# Patient Record
Sex: FEMALE | Race: WHITE | Employment: OTHER | URBAN - METROPOLITAN AREA
[De-identification: names, ages, dates, MRNs, and addresses within clinical notes are randomized per-mention and may not be internally consistent; named-entity substitution may affect disease eponyms.]

---

## 2018-03-16 PROCEDURE — 81003 URINALYSIS AUTO W/O SCOPE: CPT | Performed by: PHYSICIAN ASSISTANT

## 2020-09-02 ENCOUNTER — HOSPITAL ENCOUNTER (EMERGENCY)
Facility: HOSPITAL | Age: 60
Discharge: HOME | End: 2020-09-02
Attending: EMERGENCY MEDICINE
Payer: COMMERCIAL

## 2020-09-02 VITALS
SYSTOLIC BLOOD PRESSURE: 123 MMHG | BODY MASS INDEX: 20.46 KG/M2 | TEMPERATURE: 97 F | RESPIRATION RATE: 13 BRPM | HEIGHT: 68 IN | WEIGHT: 135 LBS | OXYGEN SATURATION: 97 % | DIASTOLIC BLOOD PRESSURE: 101 MMHG | HEART RATE: 99 BPM

## 2020-09-02 DIAGNOSIS — R11.2 NAUSEA AND VOMITING IN ADULT: Primary | ICD-10-CM

## 2020-09-02 DIAGNOSIS — R51.9 ACUTE NONINTRACTABLE HEADACHE, UNSPECIFIED HEADACHE TYPE: ICD-10-CM

## 2020-09-02 DIAGNOSIS — F41.9 ANXIETY: ICD-10-CM

## 2020-09-02 LAB
ALBUMIN SERPL-MCNC: 3.9 G/DL (ref 3.4–5)
ALBUMIN/GLOB SERPL: 1 {RATIO} (ref 1–2)
ALP LIVER SERPL-CCNC: 58 U/L (ref 46–118)
ALT SERPL-CCNC: 14 U/L (ref 13–56)
ANION GAP SERPL CALC-SCNC: 7 MMOL/L (ref 0–18)
AST SERPL-CCNC: 18 U/L (ref 15–37)
BASOPHILS # BLD AUTO: 0.03 X10(3) UL (ref 0–0.2)
BASOPHILS NFR BLD AUTO: 0.4 %
BILIRUB SERPL-MCNC: 0.6 MG/DL (ref 0.1–2)
BUN BLD-MCNC: 14 MG/DL (ref 7–18)
BUN/CREAT SERPL: 12.4 (ref 10–20)
CALCIUM BLD-MCNC: 9.9 MG/DL (ref 8.5–10.1)
CHLORIDE SERPL-SCNC: 102 MMOL/L (ref 98–112)
CO2 SERPL-SCNC: 26 MMOL/L (ref 21–32)
CREAT BLD-MCNC: 1.13 MG/DL (ref 0.55–1.02)
DEPRECATED RDW RBC AUTO: 43 FL (ref 35.1–46.3)
EOSINOPHIL # BLD AUTO: 0.06 X10(3) UL (ref 0–0.7)
EOSINOPHIL NFR BLD AUTO: 0.9 %
ERYTHROCYTE [DISTWIDTH] IN BLOOD BY AUTOMATED COUNT: 12.7 % (ref 11–15)
GLOBULIN PLAS-MCNC: 3.8 G/DL (ref 2.8–4.4)
GLUCOSE BLD-MCNC: 94 MG/DL (ref 70–99)
HCT VFR BLD AUTO: 41.1 % (ref 35–48)
HGB BLD-MCNC: 14.3 G/DL (ref 12–16)
IMM GRANULOCYTES # BLD AUTO: 0.02 X10(3) UL (ref 0–1)
IMM GRANULOCYTES NFR BLD: 0.3 %
LYMPHOCYTES # BLD AUTO: 1.11 X10(3) UL (ref 1–4)
LYMPHOCYTES NFR BLD AUTO: 15.9 %
M PROTEIN MFR SERPL ELPH: 7.7 G/DL (ref 6.4–8.2)
MCH RBC QN AUTO: 32.3 PG (ref 26–34)
MCHC RBC AUTO-ENTMCNC: 34.8 G/DL (ref 31–37)
MCV RBC AUTO: 92.8 FL (ref 80–100)
MONOCYTES # BLD AUTO: 0.62 X10(3) UL (ref 0.1–1)
MONOCYTES NFR BLD AUTO: 8.9 %
NEUTROPHILS # BLD AUTO: 5.12 X10 (3) UL (ref 1.5–7.7)
NEUTROPHILS # BLD AUTO: 5.12 X10(3) UL (ref 1.5–7.7)
NEUTROPHILS NFR BLD AUTO: 73.6 %
OSMOLALITY SERPL CALC.SUM OF ELEC: 280 MOSM/KG (ref 275–295)
PLATELET # BLD AUTO: 326 10(3)UL (ref 150–450)
POTASSIUM SERPL-SCNC: 3.9 MMOL/L (ref 3.5–5.1)
RBC # BLD AUTO: 4.43 X10(6)UL (ref 3.8–5.3)
SODIUM SERPL-SCNC: 135 MMOL/L (ref 136–145)
WBC # BLD AUTO: 7 X10(3) UL (ref 4–11)

## 2020-09-02 PROCEDURE — 96374 THER/PROPH/DIAG INJ IV PUSH: CPT

## 2020-09-02 PROCEDURE — 96375 TX/PRO/DX INJ NEW DRUG ADDON: CPT

## 2020-09-02 PROCEDURE — 85025 COMPLETE CBC W/AUTO DIFF WBC: CPT | Performed by: EMERGENCY MEDICINE

## 2020-09-02 PROCEDURE — 96361 HYDRATE IV INFUSION ADD-ON: CPT

## 2020-09-02 PROCEDURE — 80053 COMPREHEN METABOLIC PANEL: CPT | Performed by: EMERGENCY MEDICINE

## 2020-09-02 PROCEDURE — 99284 EMERGENCY DEPT VISIT MOD MDM: CPT

## 2020-09-02 RX ORDER — KETOROLAC TROMETHAMINE 30 MG/ML
15 INJECTION, SOLUTION INTRAMUSCULAR; INTRAVENOUS ONCE
Status: COMPLETED | OUTPATIENT
Start: 2020-09-02 | End: 2020-09-02

## 2020-09-02 RX ORDER — ALPRAZOLAM 0.25 MG/1
0.25 TABLET ORAL 3 TIMES DAILY PRN
Qty: 20 TABLET | Refills: 0 | Status: SHIPPED | OUTPATIENT
Start: 2020-09-02 | End: 2020-09-09

## 2020-09-02 RX ORDER — METOCLOPRAMIDE 10 MG/1
10 TABLET ORAL 3 TIMES DAILY PRN
Qty: 20 TABLET | Refills: 0 | Status: SHIPPED | OUTPATIENT
Start: 2020-09-02 | End: 2020-09-14

## 2020-09-02 RX ORDER — DIPHENHYDRAMINE HYDROCHLORIDE 50 MG/ML
50 INJECTION INTRAMUSCULAR; INTRAVENOUS ONCE
Status: COMPLETED | OUTPATIENT
Start: 2020-09-02 | End: 2020-09-02

## 2020-09-02 RX ORDER — ALPRAZOLAM 0.25 MG/1
0.25 TABLET ORAL ONCE
Status: COMPLETED | OUTPATIENT
Start: 2020-09-02 | End: 2020-09-02

## 2020-09-02 RX ORDER — METOCLOPRAMIDE HYDROCHLORIDE 5 MG/ML
10 INJECTION INTRAMUSCULAR; INTRAVENOUS ONCE
Status: COMPLETED | OUTPATIENT
Start: 2020-09-02 | End: 2020-09-02

## 2020-09-02 RX ORDER — BUSPIRONE HYDROCHLORIDE 5 MG/1
5 TABLET ORAL 2 TIMES DAILY
COMMUNITY
End: 2020-09-03

## 2020-09-02 NOTE — ED INITIAL ASSESSMENT (HPI)
PT TO ED FROM HOME WITH C/O N/V/D. LAST Wednesday SEEN AT [facility] FOR ANXIETY AND MEDICATION EVAL. PLACED ON BUSPAR AND SAYS IT IS NOT HELPING AND MAKING HER SICK TO HER STOMACH. ZOFRAN NOT HELPING.  DENIES SI/HI

## 2020-09-02 NOTE — ED PROVIDER NOTES
Patient Seen in: [facility] Emergency Department      History   Patient presents with:  Medication Reaction    Stated Complaint: evalp p medication problem    HPI    24-year-old female presents to the emergency department complaining of vomiting, el ED Triage Vitals [09/02/20 1443]   /74   Pulse 109   Resp 20   Temp 97.4 °F (36.3 °C)   Temp src Temporal   SpO2 97 %   O2 Device None (Room air)       Current:BP (!) 123/101   Pulse 99   Temp 97.4 °F (36.3 °C) (Temporal)   Resp 13   Ht 172.7 cm anxiety. Upon reevaluation the patient was significantly symptomatically improved. Heart rate had normalized, headache was nearly gone and the patient was no longer nauseated. Test results and treatment plan were discussed. MDM     #1.   Nause

## 2020-10-28 PROBLEM — R00.2 PALPITATIONS: Status: ACTIVE | Noted: 2020-10-28

## 2020-10-28 PROBLEM — I10 ESSENTIAL HYPERTENSION: Status: ACTIVE | Noted: 2020-10-28

## 2020-10-28 PROBLEM — F41.9 ANXIETY: Status: ACTIVE | Noted: 2020-10-28

## 2020-11-03 PROBLEM — F32.2 MDD (MAJOR DEPRESSIVE DISORDER), SEVERE (HCC): Status: ACTIVE | Noted: 2020-11-03

## 2020-11-09 PROBLEM — Z72.0 TOBACCO USE: Status: ACTIVE | Noted: 2020-11-09

## 2020-11-25 ENCOUNTER — HOSPITAL ENCOUNTER (EMERGENCY)
Facility: HOSPITAL | Age: 60
Discharge: HOME | End: 2020-11-25
Attending: EMERGENCY MEDICINE
Payer: COMMERCIAL

## 2020-11-25 ENCOUNTER — APPOINTMENT (OUTPATIENT)
Dept: GENERAL RADIOLOGY | Facility: HOSPITAL | Age: 60
End: 2020-11-25
Attending: EMERGENCY MEDICINE
Payer: COMMERCIAL

## 2020-11-25 ENCOUNTER — APPOINTMENT (OUTPATIENT)
Dept: CT IMAGING | Facility: HOSPITAL | Age: 60
End: 2020-11-25
Attending: EMERGENCY MEDICINE
Payer: COMMERCIAL

## 2020-11-25 VITALS
HEART RATE: 81 BPM | DIASTOLIC BLOOD PRESSURE: 63 MMHG | TEMPERATURE: 99 F | RESPIRATION RATE: 20 BRPM | SYSTOLIC BLOOD PRESSURE: 125 MMHG | WEIGHT: 130 LBS | OXYGEN SATURATION: 100 % | BODY MASS INDEX: 20 KG/M2

## 2020-11-25 DIAGNOSIS — R07.9 CHEST PAIN, UNSPECIFIED TYPE: Primary | ICD-10-CM

## 2020-11-25 DIAGNOSIS — F41.9 ANXIETY: ICD-10-CM

## 2020-11-25 DIAGNOSIS — R91.8 LUNG MASS: ICD-10-CM

## 2020-11-25 PROCEDURE — 80053 COMPREHEN METABOLIC PANEL: CPT | Performed by: EMERGENCY MEDICINE

## 2020-11-25 PROCEDURE — 71260 CT THORAX DX C+: CPT | Performed by: EMERGENCY MEDICINE

## 2020-11-25 PROCEDURE — 84484 ASSAY OF TROPONIN QUANT: CPT | Performed by: EMERGENCY MEDICINE

## 2020-11-25 PROCEDURE — 93010 ELECTROCARDIOGRAM REPORT: CPT

## 2020-11-25 PROCEDURE — 71045 X-RAY EXAM CHEST 1 VIEW: CPT | Performed by: EMERGENCY MEDICINE

## 2020-11-25 PROCEDURE — 96374 THER/PROPH/DIAG INJ IV PUSH: CPT

## 2020-11-25 PROCEDURE — 99285 EMERGENCY DEPT VISIT HI MDM: CPT

## 2020-11-25 PROCEDURE — 93005 ELECTROCARDIOGRAM TRACING: CPT

## 2020-11-25 PROCEDURE — 96375 TX/PRO/DX INJ NEW DRUG ADDON: CPT

## 2020-11-25 PROCEDURE — 85025 COMPLETE CBC W/AUTO DIFF WBC: CPT | Performed by: EMERGENCY MEDICINE

## 2020-11-25 RX ORDER — LORAZEPAM 0.5 MG/1
0.5 TABLET ORAL
Qty: 10 TABLET | Refills: 0 | Status: ON HOLD | OUTPATIENT
Start: 2020-11-25 | End: 2020-12-11

## 2020-11-25 RX ORDER — LORAZEPAM 2 MG/ML
1 INJECTION INTRAMUSCULAR ONCE
Status: COMPLETED | OUTPATIENT
Start: 2020-11-25 | End: 2020-11-25

## 2020-11-25 RX ORDER — KETOROLAC TROMETHAMINE 30 MG/ML
30 INJECTION, SOLUTION INTRAMUSCULAR; INTRAVENOUS ONCE
Status: COMPLETED | OUTPATIENT
Start: 2020-11-25 | End: 2020-11-25

## 2020-11-25 NOTE — ED INITIAL ASSESSMENT (HPI)
C/o chest pain this am,  With SOB and palpitations,  Seen for same 3 times this week at [facility],  Pt is unable to stand without assistance, clutching chest, c/o inability to catch breath, headache, restless on cart.

## 2020-11-25 NOTE — ED PROVIDER NOTES
Patient Seen in: [facility] Emergency Department      History   Patient presents with:  Chest Pain Angina    Stated Complaint: chest pain    HPI    The patient is a 69-year-old female with a history of anxiety, COPD, presenting the emergency departm 89   Resp 11/25/20 1023 26   Temp 11/25/20 1023 98.6 °F (37 °C)   Temp src 11/25/20 1023 Temporal   SpO2 11/25/20 1023 100 %   O2 Device 11/25/20 1023 None (Room air)       Current:/63   Pulse 81   Temp 98.6 °F (37 °C) (Temporal)   Resp 20   Wt 59 kg PLATELET    Narrative: The following orders were created for panel order CBC WITH DIFFERENTIAL WITH PLATELET.   Procedure                               Abnormality         Status                     ---------                               ----------- NRDR ([address]) which includes the     Dose Index Registry. PATIENT STATED HISTORY:(As transcribed by Technologist)  Patient presents     with chest pain this morning with some shortness of breath and anxiety. PTSD. biopsy. No evidence of aortic aneurysm or aortic dissection. Minimal to mild     calcified plaque formation. Normal brachiocephalic trunk, left common     carotid artery and left subclavian arteries.                         Dictated by (CST): Paula Shaikh evidence of dissection or clot. There is incidentally a mass in the right upper lobe as described above. I reviewed this with the patient, she states that she actually had a lobectomy due to scar tissue versus mass at [facility], with Dr. Sean Gardner.   However she walker

## 2020-11-28 PROBLEM — F33.2 MDD (MAJOR DEPRESSIVE DISORDER), RECURRENT EPISODE, SEVERE (HCC): Status: ACTIVE | Noted: 2020-11-28

## 2020-11-29 PROBLEM — T50.902A INTENTIONAL DRUG OVERDOSE (HCC): Status: ACTIVE | Noted: 2020-11-29

## 2020-12-02 ENCOUNTER — ANESTHESIA EVENT (OUTPATIENT)
Dept: POSTOP/PACU | Facility: HOSPITAL | Age: 60
End: 2020-12-02
Payer: COMMERCIAL

## 2020-12-02 ENCOUNTER — ANESTHESIA (OUTPATIENT)
Dept: POSTOP/PACU | Facility: HOSPITAL | Age: 60
End: 2020-12-02
Payer: COMMERCIAL

## 2020-12-02 ENCOUNTER — HOSPITAL ENCOUNTER (OUTPATIENT)
Dept: POSTOP/PACU | Facility: HOSPITAL | Age: 60
Discharge: HOME | End: 2020-12-02
Attending: Other
Payer: COMMERCIAL

## 2020-12-02 VITALS
TEMPERATURE: 99 F | BODY MASS INDEX: 19.85 KG/M2 | SYSTOLIC BLOOD PRESSURE: 144 MMHG | RESPIRATION RATE: 11 BRPM | HEART RATE: 69 BPM | HEIGHT: 68 IN | OXYGEN SATURATION: 99 % | WEIGHT: 131 LBS | DIASTOLIC BLOOD PRESSURE: 66 MMHG

## 2020-12-02 DIAGNOSIS — F33.2 SEVERE EPISODE OF RECURRENT MAJOR DEPRESSIVE DISORDER, WITHOUT PSYCHOTIC FEATURES (HCC): ICD-10-CM

## 2020-12-02 DIAGNOSIS — F32.2 MDD (MAJOR DEPRESSIVE DISORDER), SEVERE (HCC): ICD-10-CM

## 2020-12-02 PROBLEM — Z98.890 S/P ECT (ELECTROCONVULSIVE THERAPY): Status: ACTIVE | Noted: 2020-12-02

## 2020-12-02 RX ORDER — HYDROMORPHONE HYDROCHLORIDE 1 MG/ML
0.4 INJECTION, SOLUTION INTRAMUSCULAR; INTRAVENOUS; SUBCUTANEOUS EVERY 5 MIN PRN
Status: DISCONTINUED | OUTPATIENT
Start: 2020-12-02 | End: 2020-12-02 | Stop reason: HOSPADM

## 2020-12-02 RX ORDER — KETAMINE HYDROCHLORIDE 50 MG/ML
INJECTION, SOLUTION, CONCENTRATE INTRAMUSCULAR; INTRAVENOUS AS NEEDED
Status: DISCONTINUED | OUTPATIENT
Start: 2020-12-02 | End: 2020-12-02 | Stop reason: SURG

## 2020-12-02 RX ORDER — CAFFEINE AND SODIUM BENZOATE 125 MG/ML
INJECTION, SOLUTION INTRAMUSCULAR; INTRAVENOUS
Status: COMPLETED
Start: 2020-12-02 | End: 2020-12-02

## 2020-12-02 RX ORDER — ETOMIDATE 2 MG/ML
INJECTION INTRAVENOUS AS NEEDED
Status: DISCONTINUED | OUTPATIENT
Start: 2020-12-02 | End: 2020-12-02 | Stop reason: SURG

## 2020-12-02 RX ORDER — SODIUM CHLORIDE, SODIUM LACTATE, POTASSIUM CHLORIDE, CALCIUM CHLORIDE 600; 310; 30; 20 MG/100ML; MG/100ML; MG/100ML; MG/100ML
INJECTION, SOLUTION INTRAVENOUS CONTINUOUS
Status: DISCONTINUED | OUTPATIENT
Start: 2020-12-02 | End: 2020-12-02 | Stop reason: HOSPADM

## 2020-12-02 RX ORDER — ONDANSETRON 2 MG/ML
INJECTION INTRAMUSCULAR; INTRAVENOUS
Status: COMPLETED
Start: 2020-12-02 | End: 2020-12-02

## 2020-12-02 RX ORDER — NALOXONE HYDROCHLORIDE 0.4 MG/ML
80 INJECTION, SOLUTION INTRAMUSCULAR; INTRAVENOUS; SUBCUTANEOUS AS NEEDED
Status: DISCONTINUED | OUTPATIENT
Start: 2020-12-02 | End: 2020-12-02 | Stop reason: HOSPADM

## 2020-12-02 RX ORDER — KETOROLAC TROMETHAMINE 30 MG/ML
INJECTION, SOLUTION INTRAMUSCULAR; INTRAVENOUS
Status: COMPLETED
Start: 2020-12-02 | End: 2020-12-02

## 2020-12-02 RX ORDER — KETOROLAC TROMETHAMINE 30 MG/ML
15 INJECTION, SOLUTION INTRAMUSCULAR; INTRAVENOUS ONCE
Status: COMPLETED | OUTPATIENT
Start: 2020-12-02 | End: 2020-12-02

## 2020-12-02 RX ORDER — LIDOCAINE HYDROCHLORIDE 10 MG/ML
INJECTION, SOLUTION EPIDURAL; INFILTRATION; INTRACAUDAL; PERINEURAL AS NEEDED
Status: DISCONTINUED | OUTPATIENT
Start: 2020-12-02 | End: 2020-12-02 | Stop reason: SURG

## 2020-12-02 RX ORDER — GLYCOPYRROLATE 0.2 MG/ML
0.1 INJECTION, SOLUTION INTRAMUSCULAR; INTRAVENOUS ONCE
Status: COMPLETED | OUTPATIENT
Start: 2020-12-02 | End: 2020-12-02

## 2020-12-02 RX ORDER — SODIUM CHLORIDE, SODIUM LACTATE, POTASSIUM CHLORIDE, CALCIUM CHLORIDE 600; 310; 30; 20 MG/100ML; MG/100ML; MG/100ML; MG/100ML
INJECTION, SOLUTION INTRAVENOUS CONTINUOUS
Status: DISCONTINUED | OUTPATIENT
Start: 2020-12-02 | End: 2020-12-04

## 2020-12-02 RX ORDER — ONDANSETRON 2 MG/ML
4 INJECTION INTRAMUSCULAR; INTRAVENOUS ONCE
Status: COMPLETED | OUTPATIENT
Start: 2020-12-02 | End: 2020-12-02

## 2020-12-02 RX ORDER — HYDROMORPHONE HYDROCHLORIDE 1 MG/ML
INJECTION, SOLUTION INTRAMUSCULAR; INTRAVENOUS; SUBCUTANEOUS
Status: COMPLETED
Start: 2020-12-02 | End: 2020-12-02

## 2020-12-02 RX ORDER — CAFFEINE AND SODIUM BENZOATE 125 MG/ML
250 INJECTION, SOLUTION INTRAMUSCULAR; INTRAVENOUS ONCE
Status: COMPLETED | OUTPATIENT
Start: 2020-12-02 | End: 2020-12-02

## 2020-12-02 RX ORDER — GLYCOPYRROLATE 0.2 MG/ML
INJECTION, SOLUTION INTRAMUSCULAR; INTRAVENOUS
Status: COMPLETED
Start: 2020-12-02 | End: 2020-12-02

## 2020-12-02 RX ADMIN — ETOMIDATE 12 MG: 2 INJECTION INTRAVENOUS at 06:42:00

## 2020-12-02 RX ADMIN — HYDROMORPHONE HYDROCHLORIDE 0.4 MG: 1 INJECTION, SOLUTION INTRAMUSCULAR; INTRAVENOUS; SUBCUTANEOUS at 07:33:00

## 2020-12-02 RX ADMIN — ONDANSETRON 4 MG: 2 INJECTION INTRAMUSCULAR; INTRAVENOUS at 06:19:00

## 2020-12-02 RX ADMIN — SODIUM CHLORIDE, SODIUM LACTATE, POTASSIUM CHLORIDE, CALCIUM CHLORIDE: 600; 310; 30; 20 INJECTION, SOLUTION INTRAVENOUS at 06:19:00

## 2020-12-02 RX ADMIN — LIDOCAINE HYDROCHLORIDE 20 MG: 10 INJECTION, SOLUTION EPIDURAL; INFILTRATION; INTRACAUDAL; PERINEURAL at 06:42:00

## 2020-12-02 RX ADMIN — KETOROLAC TROMETHAMINE 15 MG: 30 INJECTION, SOLUTION INTRAMUSCULAR; INTRAVENOUS at 06:19:00

## 2020-12-02 RX ADMIN — CAFFEINE AND SODIUM BENZOATE 250 MG: 125 INJECTION, SOLUTION INTRAMUSCULAR; INTRAVENOUS at 06:20:00

## 2020-12-02 RX ADMIN — KETAMINE HYDROCHLORIDE 25 MG: 50 INJECTION, SOLUTION, CONCENTRATE INTRAMUSCULAR; INTRAVENOUS at 06:42:00

## 2020-12-02 RX ADMIN — GLYCOPYRROLATE 0.1 MG: 0.2 INJECTION, SOLUTION INTRAMUSCULAR; INTRAVENOUS at 06:19:00

## 2020-12-02 RX ADMIN — SODIUM CHLORIDE, SODIUM LACTATE, POTASSIUM CHLORIDE, CALCIUM CHLORIDE: 600; 310; 30; 20 INJECTION, SOLUTION INTRAVENOUS at 06:57:00

## 2020-12-02 NOTE — PROGRESS NOTES
[facility] / [facility]  ECT History & Physical    Enio Mensah Patient Status:  Outpatient   Age/Gender 61year old female MRN [id number]   Location [address] [facility] Attending Maira Alarcon MD   Hosp Day # 0 PCP Mychal Ryder mucosa normal, no drainage    or sinus tenderness   Throat:   Lips, mucosa, and tongue normal; patient with poor dentition and gingivitis and patient with crack in one tooth   Neck:   Supple, symmetrical, trachea midline   Lungs:     Clear to auscultation

## 2020-12-02 NOTE — PROGRESS NOTES
[facility] / [facility]  ECT Procedure Note    [place] Parkinson Patient Status:  Outpatient   Age/Gender 61year old female MRN [id number]   Location 13[address] [facility] Attending Rose Castañeda MD   Hosp Day # 0 PCP Marialuisa Hyman Robinul 0.1 mg IV, Toradol 15 mg IV, Zofran 4 mg IV and Caffeine 250 mg IV    ECT Medications:  Labetalol 10 mg IV, Anesthetic  Etomidate 12 mg IV followed by ketamine 25 mg IV and Succinylcholine 60 mg IV and use of soft bite block    Seizure Duration:  M

## 2020-12-02 NOTE — ANESTHESIA PREPROCEDURE EVALUATION
PRE-OP EVALUATION    Patient Name: Falguni Cobian    Pre-op Diagnosis: * No surgery found *    * No surgery found *    * Surgery not found *    Pre-op vitals reviewed.   Temp: 99.4 °F (37.4 °C)  Pulse: 77  Resp: 24  BP: 122/74  SpO2: 98 %  Body mass index (DULCOLAX) rectal suppository 10 mg, 10 mg, Rectal, Daily PRN    •  Propranolol HCl (INDERAL) tab 10 mg, 10 mg, Oral, QID PRN    •  magnesium hydroxide (MILK OF MAGNESIA) 400 MG/5ML suspension 30 mL, 30 mL, Oral, Nightly PRN    •  Alum & Mag Hydroxide-Ayse pre-op labs reviewed.   Lab Results   Component Value Date    WBC 8.8 11/25/2020    RBC 4.78 11/25/2020    HGB 15.5 11/25/2020    HCT 42.6 11/25/2020    MCV 89.1 11/25/2020    MCH 32.4 11/25/2020    MCHC 36.4 11/25/2020    RDW 12.0 11/25/2020    .0 1

## 2020-12-02 NOTE — ANESTHESIA POSTPROCEDURE EVALUATION
18 [facility] Patient Status:  Outpatient   Age/Gender 61year old female MRN [id number]   Location 1310 [facility] Attending Glennie Cushing, MD   Hosp Day # 0 PCP Giselle Busby DO       Anesthesia Post-op Note

## 2020-12-04 ENCOUNTER — ANESTHESIA EVENT (OUTPATIENT)
Dept: POSTOP/PACU | Facility: HOSPITAL | Age: 60
End: 2020-12-04
Payer: COMMERCIAL

## 2020-12-04 ENCOUNTER — HOSPITAL ENCOUNTER (OUTPATIENT)
Dept: POSTOP/PACU | Facility: HOSPITAL | Age: 60
Discharge: HOME | End: 2020-12-04
Attending: Other
Payer: COMMERCIAL

## 2020-12-04 ENCOUNTER — ANESTHESIA (OUTPATIENT)
Dept: POSTOP/PACU | Facility: HOSPITAL | Age: 60
End: 2020-12-04
Payer: COMMERCIAL

## 2020-12-04 VITALS
OXYGEN SATURATION: 100 % | TEMPERATURE: 98 F | HEART RATE: 77 BPM | BODY MASS INDEX: 20 KG/M2 | SYSTOLIC BLOOD PRESSURE: 154 MMHG | RESPIRATION RATE: 16 BRPM | HEIGHT: 68 IN | DIASTOLIC BLOOD PRESSURE: 68 MMHG

## 2020-12-04 DIAGNOSIS — F33.2 SEVERE EPISODE OF RECURRENT MAJOR DEPRESSIVE DISORDER, WITHOUT PSYCHOTIC FEATURES (HCC): ICD-10-CM

## 2020-12-04 DIAGNOSIS — F32.2 MDD (MAJOR DEPRESSIVE DISORDER), SEVERE (HCC): ICD-10-CM

## 2020-12-04 RX ORDER — GLYCOPYRROLATE 0.2 MG/ML
INJECTION, SOLUTION INTRAMUSCULAR; INTRAVENOUS
Status: COMPLETED
Start: 2020-12-04 | End: 2020-12-04

## 2020-12-04 RX ORDER — SODIUM CHLORIDE, SODIUM LACTATE, POTASSIUM CHLORIDE, CALCIUM CHLORIDE 600; 310; 30; 20 MG/100ML; MG/100ML; MG/100ML; MG/100ML
INJECTION, SOLUTION INTRAVENOUS CONTINUOUS
Status: DISCONTINUED | OUTPATIENT
Start: 2020-12-04 | End: 2020-12-04 | Stop reason: HOSPADM

## 2020-12-04 RX ORDER — HYDROMORPHONE HYDROCHLORIDE 1 MG/ML
0.4 INJECTION, SOLUTION INTRAMUSCULAR; INTRAVENOUS; SUBCUTANEOUS EVERY 5 MIN PRN
Status: DISCONTINUED | OUTPATIENT
Start: 2020-12-04 | End: 2020-12-04 | Stop reason: HOSPADM

## 2020-12-04 RX ORDER — SUMATRIPTAN 6 MG/.5ML
6 INJECTION, SOLUTION SUBCUTANEOUS ONCE
Status: COMPLETED | OUTPATIENT
Start: 2020-12-04 | End: 2020-12-04

## 2020-12-04 RX ORDER — ONDANSETRON 2 MG/ML
INJECTION INTRAMUSCULAR; INTRAVENOUS
Status: COMPLETED
Start: 2020-12-04 | End: 2020-12-04

## 2020-12-04 RX ORDER — DEXAMETHASONE SODIUM PHOSPHATE 4 MG/ML
8 VIAL (ML) INJECTION ONCE
Status: COMPLETED | OUTPATIENT
Start: 2020-12-04 | End: 2020-12-04

## 2020-12-04 RX ORDER — KETOROLAC TROMETHAMINE 30 MG/ML
INJECTION, SOLUTION INTRAMUSCULAR; INTRAVENOUS
Status: COMPLETED
Start: 2020-12-04 | End: 2020-12-04

## 2020-12-04 RX ORDER — KETAMINE HYDROCHLORIDE 50 MG/ML
INJECTION, SOLUTION, CONCENTRATE INTRAMUSCULAR; INTRAVENOUS AS NEEDED
Status: DISCONTINUED | OUTPATIENT
Start: 2020-12-04 | End: 2020-12-04 | Stop reason: SURG

## 2020-12-04 RX ORDER — LABETALOL HYDROCHLORIDE 5 MG/ML
INJECTION, SOLUTION INTRAVENOUS AS NEEDED
Status: DISCONTINUED | OUTPATIENT
Start: 2020-12-04 | End: 2020-12-04 | Stop reason: SURG

## 2020-12-04 RX ORDER — ONDANSETRON 2 MG/ML
4 INJECTION INTRAMUSCULAR; INTRAVENOUS AS NEEDED
Status: DISCONTINUED | OUTPATIENT
Start: 2020-12-04 | End: 2020-12-04 | Stop reason: HOSPADM

## 2020-12-04 RX ORDER — CAFFEINE AND SODIUM BENZOATE 125 MG/ML
INJECTION, SOLUTION INTRAMUSCULAR; INTRAVENOUS
Status: COMPLETED
Start: 2020-12-04 | End: 2020-12-04

## 2020-12-04 RX ORDER — SUMATRIPTAN 6 MG/.5ML
INJECTION, SOLUTION SUBCUTANEOUS
Status: COMPLETED
Start: 2020-12-04 | End: 2020-12-04

## 2020-12-04 RX ORDER — DEXAMETHASONE SODIUM PHOSPHATE 4 MG/ML
VIAL (ML) INJECTION
Status: COMPLETED
Start: 2020-12-04 | End: 2020-12-04

## 2020-12-04 RX ORDER — CAFFEINE AND SODIUM BENZOATE 125 MG/ML
250 INJECTION, SOLUTION INTRAMUSCULAR; INTRAVENOUS ONCE
Status: COMPLETED | OUTPATIENT
Start: 2020-12-04 | End: 2020-12-04

## 2020-12-04 RX ORDER — KETOROLAC TROMETHAMINE 30 MG/ML
30 INJECTION, SOLUTION INTRAMUSCULAR; INTRAVENOUS ONCE
Status: COMPLETED | OUTPATIENT
Start: 2020-12-04 | End: 2020-12-04

## 2020-12-04 RX ORDER — ONDANSETRON 2 MG/ML
4 INJECTION INTRAMUSCULAR; INTRAVENOUS ONCE
Status: COMPLETED | OUTPATIENT
Start: 2020-12-04 | End: 2020-12-04

## 2020-12-04 RX ORDER — GLYCOPYRROLATE 0.2 MG/ML
0.1 INJECTION, SOLUTION INTRAMUSCULAR; INTRAVENOUS ONCE
Status: COMPLETED | OUTPATIENT
Start: 2020-12-04 | End: 2020-12-04

## 2020-12-04 RX ORDER — SODIUM CHLORIDE, SODIUM LACTATE, POTASSIUM CHLORIDE, CALCIUM CHLORIDE 600; 310; 30; 20 MG/100ML; MG/100ML; MG/100ML; MG/100ML
INJECTION, SOLUTION INTRAVENOUS CONTINUOUS
Status: DISCONTINUED | OUTPATIENT
Start: 2020-12-04 | End: 2020-12-06

## 2020-12-04 RX ADMIN — ONDANSETRON 4 MG: 2 INJECTION INTRAMUSCULAR; INTRAVENOUS at 07:27:00

## 2020-12-04 RX ADMIN — SUMATRIPTAN 6 MG: 6 INJECTION, SOLUTION SUBCUTANEOUS at 07:01:00

## 2020-12-04 RX ADMIN — ONDANSETRON 4 MG: 2 INJECTION INTRAMUSCULAR; INTRAVENOUS at 06:11:00

## 2020-12-04 RX ADMIN — LABETALOL HYDROCHLORIDE 10 MG: 5 INJECTION, SOLUTION INTRAVENOUS at 06:54:00

## 2020-12-04 RX ADMIN — GLYCOPYRROLATE 0.1 MG: 0.2 INJECTION, SOLUTION INTRAMUSCULAR; INTRAVENOUS at 06:04:00

## 2020-12-04 RX ADMIN — SODIUM CHLORIDE, SODIUM LACTATE, POTASSIUM CHLORIDE, CALCIUM CHLORIDE: 600; 310; 30; 20 INJECTION, SOLUTION INTRAVENOUS at 06:03:00

## 2020-12-04 RX ADMIN — DEXAMETHASONE SODIUM PHOSPHATE 8 MG: 4 MG/ML VIAL (ML) INJECTION at 06:08:00

## 2020-12-04 RX ADMIN — KETOROLAC TROMETHAMINE 30 MG: 30 INJECTION, SOLUTION INTRAMUSCULAR; INTRAVENOUS at 06:06:00

## 2020-12-04 RX ADMIN — KETAMINE HYDROCHLORIDE 75 MG: 50 INJECTION, SOLUTION, CONCENTRATE INTRAMUSCULAR; INTRAVENOUS at 06:54:00

## 2020-12-04 RX ADMIN — CAFFEINE AND SODIUM BENZOATE 250 MG: 125 INJECTION, SOLUTION INTRAMUSCULAR; INTRAVENOUS at 06:30:00

## 2020-12-04 NOTE — PROGRESS NOTES
[facility] / [facility]  ECT History & Physical    April Perish Patient Status:  Outpatient   Age/Gender 61year old female MRN [id number]   Location 1310 [facility] Attending Magdalene Baig MD   Hosp Day # 0 PCP Nikole Castillo Lips, mucosa, and tongue normal; left molar unchanged   Neck:   Supple, symmetrical, trachea midline   Lungs:     Clear to auscultation bilaterally, respirations unlabored    Heart:    Regular rate and rhythm, S1 and S2 normal, no murmur, rub   or gallop

## 2020-12-04 NOTE — ANESTHESIA PREPROCEDURE EVALUATION
PRE-OP EVALUATION    Patient Name: Hui Boyle    Pre-op Diagnosis: * No surgery found *    * No surgery found *    * Surgery not found *    Pre-op vitals reviewed.   Temp: 98.3 °F (36.8 °C)  Pulse: 82  Resp: 16  BP: 121/55  SpO2: 96 %  There is no hei Oral, Q1H PRN    •  docusate sodium (COLACE) cap 100 mg, 100 mg, Oral, BID    •  PEG 3350 (MIRALAX) powder packet 17 g, 17 g, Oral, Daily PRN    •  Acidophilus/Pectin (PROBIOTIC) CAPS 1 capsule, 1 capsule, Oral, Daily    •  bisacodyl (DULCOLAX) rectal supp Quit date: 2020        Years since quittin.0      Smokeless tobacco: Former User        Quit date: 2018      Tobacco comment: wearing a patch since 2020    Alcohol use: Never      Frequency: Never      Comment: very rare      Drug use:

## 2020-12-04 NOTE — ANESTHESIA POSTPROCEDURE EVALUATION
18 [facility] Patient Status:  Outpatient   Age/Gender 61year old female MRN [id number]   Location 1310 [facility] Attending Melany Burr MD   Hosp Day # 0 PCP Verner Nao, DO       Anesthesia Post-op Note

## 2020-12-04 NOTE — PROGRESS NOTES
[facility] / [facility]  ECT Procedure Note    Harshad Dutton Patient Status:  Outpatient   Age/Gender 61year old female MRN [id number]   Location 1310 [facility] Attending Lord Indy MD   Hosp Day # 0 PCP Mary Salas milligrams IV and use of soft bite block    Seizure Duration:  Motor: 28 seconds       EE seconds    Post-ECT Condition:  Treatment unremarkable    Post-ECT Medications:  Tigan 200 mg IM    Al Zhang

## 2020-12-07 ENCOUNTER — ANESTHESIA EVENT (OUTPATIENT)
Dept: POSTOP/PACU | Facility: HOSPITAL | Age: 60
End: 2020-12-07
Payer: COMMERCIAL

## 2020-12-07 ENCOUNTER — ANESTHESIA (OUTPATIENT)
Dept: POSTOP/PACU | Facility: HOSPITAL | Age: 60
End: 2020-12-07
Payer: COMMERCIAL

## 2020-12-07 ENCOUNTER — HOSPITAL ENCOUNTER (OUTPATIENT)
Dept: POSTOP/PACU | Facility: HOSPITAL | Age: 60
Discharge: HOME | End: 2020-12-07
Attending: Other
Payer: COMMERCIAL

## 2020-12-07 VITALS
RESPIRATION RATE: 18 BRPM | SYSTOLIC BLOOD PRESSURE: 142 MMHG | TEMPERATURE: 98 F | BODY MASS INDEX: 20 KG/M2 | HEART RATE: 86 BPM | HEIGHT: 68 IN | OXYGEN SATURATION: 97 % | DIASTOLIC BLOOD PRESSURE: 68 MMHG

## 2020-12-07 DIAGNOSIS — F33.2 SEVERE EPISODE OF RECURRENT MAJOR DEPRESSIVE DISORDER, WITHOUT PSYCHOTIC FEATURES (HCC): ICD-10-CM

## 2020-12-07 DIAGNOSIS — F32.2 MDD (MAJOR DEPRESSIVE DISORDER), SEVERE (HCC): ICD-10-CM

## 2020-12-07 RX ORDER — DEXAMETHASONE SODIUM PHOSPHATE 4 MG/ML
VIAL (ML) INJECTION
Status: COMPLETED
Start: 2020-12-07 | End: 2020-12-07

## 2020-12-07 RX ORDER — ONDANSETRON 2 MG/ML
INJECTION INTRAMUSCULAR; INTRAVENOUS
Status: COMPLETED
Start: 2020-12-07 | End: 2020-12-07

## 2020-12-07 RX ORDER — ONDANSETRON 2 MG/ML
8 INJECTION INTRAMUSCULAR; INTRAVENOUS ONCE
Status: COMPLETED | OUTPATIENT
Start: 2020-12-07 | End: 2020-12-07

## 2020-12-07 RX ORDER — KETOROLAC TROMETHAMINE 30 MG/ML
30 INJECTION, SOLUTION INTRAMUSCULAR; INTRAVENOUS ONCE
Status: COMPLETED | OUTPATIENT
Start: 2020-12-07 | End: 2020-12-07

## 2020-12-07 RX ORDER — KETOROLAC TROMETHAMINE 30 MG/ML
INJECTION, SOLUTION INTRAMUSCULAR; INTRAVENOUS
Status: COMPLETED
Start: 2020-12-07 | End: 2020-12-07

## 2020-12-07 RX ORDER — CAFFEINE AND SODIUM BENZOATE 125 MG/ML
INJECTION, SOLUTION INTRAMUSCULAR; INTRAVENOUS
Status: COMPLETED
Start: 2020-12-07 | End: 2020-12-07

## 2020-12-07 RX ORDER — GLYCOPYRROLATE 0.2 MG/ML
0.1 INJECTION, SOLUTION INTRAMUSCULAR; INTRAVENOUS ONCE
Status: COMPLETED | OUTPATIENT
Start: 2020-12-07 | End: 2020-12-07

## 2020-12-07 RX ORDER — SODIUM CHLORIDE, SODIUM LACTATE, POTASSIUM CHLORIDE, CALCIUM CHLORIDE 600; 310; 30; 20 MG/100ML; MG/100ML; MG/100ML; MG/100ML
INJECTION, SOLUTION INTRAVENOUS CONTINUOUS
Status: DISCONTINUED | OUTPATIENT
Start: 2020-12-07 | End: 2020-12-09

## 2020-12-07 RX ORDER — LABETALOL HYDROCHLORIDE 5 MG/ML
INJECTION, SOLUTION INTRAVENOUS AS NEEDED
Status: DISCONTINUED | OUTPATIENT
Start: 2020-12-07 | End: 2020-12-07 | Stop reason: SURG

## 2020-12-07 RX ORDER — SUMATRIPTAN 6 MG/.5ML
6 INJECTION, SOLUTION SUBCUTANEOUS ONCE
Status: COMPLETED | OUTPATIENT
Start: 2020-12-07 | End: 2020-12-07

## 2020-12-07 RX ORDER — KETAMINE HYDROCHLORIDE 50 MG/ML
INJECTION, SOLUTION, CONCENTRATE INTRAMUSCULAR; INTRAVENOUS AS NEEDED
Status: DISCONTINUED | OUTPATIENT
Start: 2020-12-07 | End: 2020-12-07 | Stop reason: SURG

## 2020-12-07 RX ORDER — GLYCOPYRROLATE 0.2 MG/ML
INJECTION, SOLUTION INTRAMUSCULAR; INTRAVENOUS
Status: COMPLETED
Start: 2020-12-07 | End: 2020-12-07

## 2020-12-07 RX ORDER — SUMATRIPTAN 6 MG/.5ML
INJECTION, SOLUTION SUBCUTANEOUS
Status: COMPLETED
Start: 2020-12-07 | End: 2020-12-07

## 2020-12-07 RX ORDER — DEXAMETHASONE SODIUM PHOSPHATE 4 MG/ML
8 VIAL (ML) INJECTION ONCE
Status: COMPLETED | OUTPATIENT
Start: 2020-12-07 | End: 2020-12-07

## 2020-12-07 RX ORDER — CAFFEINE AND SODIUM BENZOATE 125 MG/ML
250 INJECTION, SOLUTION INTRAMUSCULAR; INTRAVENOUS ONCE
Status: COMPLETED | OUTPATIENT
Start: 2020-12-07 | End: 2020-12-07

## 2020-12-07 RX ADMIN — SODIUM CHLORIDE, SODIUM LACTATE, POTASSIUM CHLORIDE, CALCIUM CHLORIDE: 600; 310; 30; 20 INJECTION, SOLUTION INTRAVENOUS at 06:44:00

## 2020-12-07 RX ADMIN — SUMATRIPTAN 6 MG: 6 INJECTION, SOLUTION SUBCUTANEOUS at 07:47:00

## 2020-12-07 RX ADMIN — ONDANSETRON 8 MG: 2 INJECTION INTRAMUSCULAR; INTRAVENOUS at 06:44:00

## 2020-12-07 RX ADMIN — DEXAMETHASONE SODIUM PHOSPHATE 8 MG: 4 MG/ML VIAL (ML) INJECTION at 06:44:00

## 2020-12-07 RX ADMIN — KETOROLAC TROMETHAMINE 30 MG: 30 INJECTION, SOLUTION INTRAMUSCULAR; INTRAVENOUS at 06:48:00

## 2020-12-07 RX ADMIN — GLYCOPYRROLATE 0.1 MG: 0.2 INJECTION, SOLUTION INTRAMUSCULAR; INTRAVENOUS at 06:47:00

## 2020-12-07 RX ADMIN — CAFFEINE AND SODIUM BENZOATE 250 MG: 125 INJECTION, SOLUTION INTRAMUSCULAR; INTRAVENOUS at 07:05:00

## 2020-12-07 RX ADMIN — LABETALOL HYDROCHLORIDE 10 MG: 5 INJECTION, SOLUTION INTRAVENOUS at 07:38:00

## 2020-12-07 RX ADMIN — KETAMINE HYDROCHLORIDE 75 MG: 50 INJECTION, SOLUTION, CONCENTRATE INTRAMUSCULAR; INTRAVENOUS at 07:38:00

## 2020-12-07 NOTE — PROGRESS NOTES
Marcella Monge / [facility]  ECT History & Physical    Rella Nicho Patient Status:  Outpatient   Age/Gender 61year old female MRN [id number]   Location 1310 [facility] Attending Molly Mahajan MD   Hosp Day # 0 PCP Sally Mcdaniel 50.00        Pack years: 50        Types: Cigarettes        Quit date: 2020        Years since quittin.0      Smokeless tobacco: Former User        Quit date: 2018      Tobacco comment: wearing a patch since 2020    Substance and Sexua atraumatic, no cyanosis or edema   Pulses:   2+ and symmetric all extremities   Skin:   Skin color, texture, turgor normal, no rashes or lesions   Neurologic:   CNII-XII intact, normal strength, sensation and reflexes     throughout     Impressions & Plans

## 2020-12-07 NOTE — ANESTHESIA PREPROCEDURE EVALUATION
PRE-OP EVALUATION    Patient Name: Mamadou Mcnair    Pre-op Diagnosis: * No surgery found *    * No surgery found *    * Surgery not found *    Pre-op vitals reviewed. There is no height or weight on file to calculate BMI.     Current medications r Adhesive Tape      Anesthesia Evaluation    Patient summary reviewed. Anesthetic Complications  (-) history of anesthetic complications         GI/Hepatic/Renal    Negative GI/hepatic/renal ROS.                              Cardiovascular >3 FB  TM distance: > 6 cm  Neck ROM: limited Cardiovascular    Cardiovascular exam normal.         Dental  Comment: Cracked upper tooth           Pulmonary    Pulmonary exam normal.                 Other findings            ASA: 3   Plan: general  NPO sta

## 2020-12-07 NOTE — ANESTHESIA POSTPROCEDURE EVALUATION
18 [facility] Patient Status:  Outpatient   Age/Gender 61year old female MRN [id number]   Location 1310 [facility] Attending Harry Butler MD   Hosp Day # 0 PCP Viceden Room, DO       Anesthesia Post-op Note

## 2020-12-07 NOTE — PROGRESS NOTES
[facility] / [facility]  ECT Procedure Note    Sean Lewis Patient Status:  Outpatient   Age/Gender 61year old female MRN [id number]   Location 1310 [facility] Attending Freddie Zavala MD   Hosp Day # 0 PCP Leonardo Ferreira

## 2020-12-09 ENCOUNTER — HOSPITAL ENCOUNTER (OUTPATIENT)
Dept: POSTOP/PACU | Facility: HOSPITAL | Age: 60
Discharge: HOME | End: 2020-12-09
Attending: Other
Payer: COMMERCIAL

## 2020-12-09 ENCOUNTER — ANESTHESIA (OUTPATIENT)
Dept: POSTOP/PACU | Facility: HOSPITAL | Age: 60
End: 2020-12-09
Payer: COMMERCIAL

## 2020-12-09 ENCOUNTER — ANESTHESIA EVENT (OUTPATIENT)
Dept: POSTOP/PACU | Facility: HOSPITAL | Age: 60
End: 2020-12-09
Payer: COMMERCIAL

## 2020-12-09 VITALS
TEMPERATURE: 99 F | HEIGHT: 68 IN | RESPIRATION RATE: 22 BRPM | DIASTOLIC BLOOD PRESSURE: 94 MMHG | HEART RATE: 86 BPM | OXYGEN SATURATION: 93 % | SYSTOLIC BLOOD PRESSURE: 136 MMHG | BODY MASS INDEX: 21 KG/M2

## 2020-12-09 DIAGNOSIS — F33.2 SEVERE EPISODE OF RECURRENT MAJOR DEPRESSIVE DISORDER, WITHOUT PSYCHOTIC FEATURES (HCC): ICD-10-CM

## 2020-12-09 DIAGNOSIS — F32.2 MDD (MAJOR DEPRESSIVE DISORDER), SEVERE (HCC): ICD-10-CM

## 2020-12-09 RX ORDER — ONDANSETRON 2 MG/ML
INJECTION INTRAMUSCULAR; INTRAVENOUS
Status: COMPLETED
Start: 2020-12-09 | End: 2020-12-09

## 2020-12-09 RX ORDER — GLYCOPYRROLATE 0.2 MG/ML
0.1 INJECTION, SOLUTION INTRAMUSCULAR; INTRAVENOUS ONCE
Status: COMPLETED | OUTPATIENT
Start: 2020-12-09 | End: 2020-12-09

## 2020-12-09 RX ORDER — LABETALOL HYDROCHLORIDE 5 MG/ML
INJECTION, SOLUTION INTRAVENOUS AS NEEDED
Status: DISCONTINUED | OUTPATIENT
Start: 2020-12-09 | End: 2020-12-09 | Stop reason: SURG

## 2020-12-09 RX ORDER — KETOROLAC TROMETHAMINE 30 MG/ML
INJECTION, SOLUTION INTRAMUSCULAR; INTRAVENOUS
Status: COMPLETED
Start: 2020-12-09 | End: 2020-12-09

## 2020-12-09 RX ORDER — NALOXONE HYDROCHLORIDE 0.4 MG/ML
80 INJECTION, SOLUTION INTRAMUSCULAR; INTRAVENOUS; SUBCUTANEOUS AS NEEDED
Status: DISCONTINUED | OUTPATIENT
Start: 2020-12-09 | End: 2020-12-09 | Stop reason: HOSPADM

## 2020-12-09 RX ORDER — ONDANSETRON 2 MG/ML
8 INJECTION INTRAMUSCULAR; INTRAVENOUS ONCE
Status: COMPLETED | OUTPATIENT
Start: 2020-12-09 | End: 2020-12-09

## 2020-12-09 RX ORDER — CAFFEINE AND SODIUM BENZOATE 125 MG/ML
INJECTION, SOLUTION INTRAMUSCULAR; INTRAVENOUS
Status: COMPLETED
Start: 2020-12-09 | End: 2020-12-09

## 2020-12-09 RX ORDER — SUMATRIPTAN 6 MG/.5ML
INJECTION, SOLUTION SUBCUTANEOUS
Status: COMPLETED
Start: 2020-12-09 | End: 2020-12-09

## 2020-12-09 RX ORDER — KETOROLAC TROMETHAMINE 30 MG/ML
30 INJECTION, SOLUTION INTRAMUSCULAR; INTRAVENOUS ONCE
Status: COMPLETED | OUTPATIENT
Start: 2020-12-09 | End: 2020-12-09

## 2020-12-09 RX ORDER — SUMATRIPTAN 6 MG/.5ML
6 INJECTION, SOLUTION SUBCUTANEOUS ONCE
Status: COMPLETED | OUTPATIENT
Start: 2020-12-09 | End: 2020-12-09

## 2020-12-09 RX ORDER — SODIUM CHLORIDE, SODIUM LACTATE, POTASSIUM CHLORIDE, CALCIUM CHLORIDE 600; 310; 30; 20 MG/100ML; MG/100ML; MG/100ML; MG/100ML
INJECTION, SOLUTION INTRAVENOUS CONTINUOUS
Status: DISCONTINUED | OUTPATIENT
Start: 2020-12-09 | End: 2020-12-11

## 2020-12-09 RX ORDER — HYDROMORPHONE HYDROCHLORIDE 1 MG/ML
0.4 INJECTION, SOLUTION INTRAMUSCULAR; INTRAVENOUS; SUBCUTANEOUS EVERY 5 MIN PRN
Status: DISCONTINUED | OUTPATIENT
Start: 2020-12-09 | End: 2020-12-09 | Stop reason: HOSPADM

## 2020-12-09 RX ORDER — ACETAMINOPHEN 500 MG
1000 TABLET ORAL ONCE AS NEEDED
Status: DISCONTINUED | OUTPATIENT
Start: 2020-12-09 | End: 2020-12-09 | Stop reason: HOSPADM

## 2020-12-09 RX ORDER — CAFFEINE AND SODIUM BENZOATE 125 MG/ML
250 INJECTION, SOLUTION INTRAMUSCULAR; INTRAVENOUS ONCE
Status: COMPLETED | OUTPATIENT
Start: 2020-12-09 | End: 2020-12-09

## 2020-12-09 RX ORDER — GLYCOPYRROLATE 0.2 MG/ML
INJECTION, SOLUTION INTRAMUSCULAR; INTRAVENOUS
Status: COMPLETED
Start: 2020-12-09 | End: 2020-12-09

## 2020-12-09 RX ORDER — ONDANSETRON 2 MG/ML
4 INJECTION INTRAMUSCULAR; INTRAVENOUS AS NEEDED
Status: DISCONTINUED | OUTPATIENT
Start: 2020-12-09 | End: 2020-12-09 | Stop reason: HOSPADM

## 2020-12-09 RX ORDER — DEXAMETHASONE SODIUM PHOSPHATE 4 MG/ML
8 VIAL (ML) INJECTION ONCE
Status: COMPLETED | OUTPATIENT
Start: 2020-12-09 | End: 2020-12-09

## 2020-12-09 RX ORDER — KETAMINE HYDROCHLORIDE 50 MG/ML
INJECTION, SOLUTION, CONCENTRATE INTRAMUSCULAR; INTRAVENOUS AS NEEDED
Status: DISCONTINUED | OUTPATIENT
Start: 2020-12-09 | End: 2020-12-09 | Stop reason: SURG

## 2020-12-09 RX ORDER — SODIUM CHLORIDE, SODIUM LACTATE, POTASSIUM CHLORIDE, CALCIUM CHLORIDE 600; 310; 30; 20 MG/100ML; MG/100ML; MG/100ML; MG/100ML
INJECTION, SOLUTION INTRAVENOUS CONTINUOUS
Status: DISCONTINUED | OUTPATIENT
Start: 2020-12-09 | End: 2020-12-09 | Stop reason: HOSPADM

## 2020-12-09 RX ORDER — DEXAMETHASONE SODIUM PHOSPHATE 4 MG/ML
VIAL (ML) INJECTION
Status: DISCONTINUED
Start: 2020-12-09 | End: 2020-12-09 | Stop reason: WASHOUT

## 2020-12-09 RX ORDER — MIDAZOLAM HYDROCHLORIDE 1 MG/ML
1 INJECTION INTRAMUSCULAR; INTRAVENOUS EVERY 5 MIN PRN
Status: DISCONTINUED | OUTPATIENT
Start: 2020-12-09 | End: 2020-12-09 | Stop reason: HOSPADM

## 2020-12-09 RX ORDER — DEXAMETHASONE SODIUM PHOSPHATE 4 MG/ML
VIAL (ML) INJECTION
Status: COMPLETED
Start: 2020-12-09 | End: 2020-12-09

## 2020-12-09 RX ADMIN — ONDANSETRON 8 MG: 2 INJECTION INTRAMUSCULAR; INTRAVENOUS at 06:20:00

## 2020-12-09 RX ADMIN — CAFFEINE AND SODIUM BENZOATE 250 MG: 125 INJECTION, SOLUTION INTRAMUSCULAR; INTRAVENOUS at 06:41:00

## 2020-12-09 RX ADMIN — SUMATRIPTAN 6 MG: 6 INJECTION, SOLUTION SUBCUTANEOUS at 06:26:00

## 2020-12-09 RX ADMIN — KETAMINE HYDROCHLORIDE 75 MG: 50 INJECTION, SOLUTION, CONCENTRATE INTRAMUSCULAR; INTRAVENOUS at 07:19:00

## 2020-12-09 RX ADMIN — LABETALOL HYDROCHLORIDE 10 MG: 5 INJECTION, SOLUTION INTRAVENOUS at 07:19:00

## 2020-12-09 RX ADMIN — SODIUM CHLORIDE, SODIUM LACTATE, POTASSIUM CHLORIDE, CALCIUM CHLORIDE: 600; 310; 30; 20 INJECTION, SOLUTION INTRAVENOUS at 06:25:00

## 2020-12-09 RX ADMIN — DEXAMETHASONE SODIUM PHOSPHATE 8 MG: 4 MG/ML VIAL (ML) INJECTION at 06:20:00

## 2020-12-09 RX ADMIN — GLYCOPYRROLATE 0.1 MG: 0.2 INJECTION, SOLUTION INTRAMUSCULAR; INTRAVENOUS at 06:21:00

## 2020-12-09 RX ADMIN — SODIUM CHLORIDE, SODIUM LACTATE, POTASSIUM CHLORIDE, CALCIUM CHLORIDE: 600; 310; 30; 20 INJECTION, SOLUTION INTRAVENOUS at 07:35:00

## 2020-12-09 RX ADMIN — KETOROLAC TROMETHAMINE 30 MG: 30 INJECTION, SOLUTION INTRAMUSCULAR; INTRAVENOUS at 06:21:00

## 2020-12-09 NOTE — ANESTHESIA POSTPROCEDURE EVALUATION
18 [facility] Patient Status:  Outpatient   Age/Gender 61year old female MRN [id number]   Location 1310 [facility] Attending Elfego Daugherty MD   Hosp Day # 0 PCP Helen Living, DO       Anesthesia Post-op Note

## 2020-12-09 NOTE — ANESTHESIA PREPROCEDURE EVALUATION
PRE-OP EVALUATION    Patient Name: Zandra Aly    Pre-op Diagnosis: F33.2    ECT      Pre-op vitals reviewed. Temp: 98.1 °F (36.7 °C)  Pulse: 72  Resp: 16  BP: 116/60  SpO2: 95 %  Body mass index is 20.53 kg/m². Current medications reviewed.   [facility] (ZYPREXA) tab 7.5 mg, 7.5 mg, Oral, Q6H PRN    •  Metoprolol Succinate ER (Toprol XL) 24 hr tab 25 mg, 25 mg, Oral, BID    •  Aquaphor ointment, , Topical, BID PRN    •  lubiprostone (AMITIZA) cap 24 mcg, 24 mcg, Oral, BID with meals    •  mirtazapine (REM depression  (+) anxiety                            Past Surgical History:   Procedure Laterality Date   • APPENDECTOMY     • ENLARGE BREAST WITH IMPLANT     • LUNG LOBECTOMY     • OTHER SURGICAL HISTORY  2007    interstim   • OTHER SURGICAL HISTORY  2013 Admission:  **None**

## 2020-12-09 NOTE — PROGRESS NOTES
Holli Guillen / [facility]  ECT History & Physical    Enio Makenna Patient Status:  Outpatient   Age/Gender 61year old female MRN [id number]   Location 1310 [facility] Attending Maira Alarcon MD   Hosp Day # 0 PCP Mychal Ryder Clear to auscultation bilaterally, respirations unlabored    Heart:    Regular rate and rhythm, S1 and S2 normal, no murmur, rub   or gallop   Abdomen:     Soft, non-tender, bowel sounds active all four quadrants,     no masses, no organomegaly   Extremit

## 2020-12-09 NOTE — PROGRESS NOTES
[facility] / [facility]  ECT Procedure Note    Zandra Boseondina Patient Status:  Outpatient   Age/Gender 61year old female MRN [id number]   Location 1310 [facility] Attending Gustavo Martinez MD   Hosp Day # 0 PCP Florinda Chiang

## 2020-12-12 PROBLEM — K59.00 CONSTIPATION: Status: ACTIVE | Noted: 2020-12-12

## 2020-12-12 PROBLEM — F33.9 MAJOR DEPRESSION, RECURRENT (HCC): Status: ACTIVE | Noted: 2020-12-12

## 2020-12-27 PROBLEM — N32.81 OAB (OVERACTIVE BLADDER): Status: ACTIVE | Noted: 2020-12-27

## 2021-01-10 NOTE — ED INITIAL ASSESSMENT (HPI)
Pt to ED stating \"I can't get my anxiety under control. \" Pt reports being involved in [employer] program and taking medications at home as prescribed but anxiety seems to have gotten worse the past 2 days.

## 2021-01-10 NOTE — ED NOTES
PT STATES SHE HAS BEEN HAVING RACING THOUGHTS AND UNKNOWN FEAR, + HEADACHE. COMPLIANT WITH MEDICATIONS.  \"I JUST WANT TO CRY AND I CAN'T CRY\" DENIES SI/HI

## 2021-01-10 NOTE — ED PROVIDER NOTES
Patient Seen in: [facility] Emergency Department      History   Patient presents with: Anxiety/Panic attack    Stated Complaint: Anxiety/Nausea, denies SI    HPI/Subjective:   HPI        Patient here for help with anxiety.   She is 61year-old, her for stated complaint: Anxiety/Nausea, denies SI  Other systems are as noted in HPI. Constitutional and vital signs reviewed. All other systems reviewed and negative except as noted above.     Physical Exam     ED Triage Vitals [01/10/21 1113]    therapy and not a permanent solution. I think some discussion about the risks of Ativan including drowsiness and unsteady gait and developing potential dependency. She is very aware this and states she will using it sparingly.   She feels comfortable grace

## 2021-02-25 ENCOUNTER — APPOINTMENT (OUTPATIENT)
Dept: GENERAL RADIOLOGY | Facility: HOSPITAL | Age: 61
End: 2021-02-25
Payer: COMMERCIAL

## 2021-02-25 PROCEDURE — 71045 X-RAY EXAM CHEST 1 VIEW: CPT | Performed by: STUDENT IN AN ORGANIZED HEALTH CARE EDUCATION/TRAINING PROGRAM

## 2021-02-25 NOTE — ED PROVIDER NOTES
Patient Seen in: [facility] Emergency Department      History   Patient presents with:  Eval-P    Stated Complaint: Pt sts she is feeling anxious. Asked pt if she was suicidal, pt paused.      HPI/Subjective:   HPI    Patient is a 77-year-old female rare    Drug use: No             Review of Systems    Positive for stated complaint: Pt sts she is feeling anxious. Asked pt if she was suicidal, pt paused. Other systems are as noted in HPI. Constitutional and vital signs reviewed.       All other syste Notable for the following components:    Lymphocyte Absolute 0.95 (*)     All other components within normal limits   ETHYL ALCOHOL - Normal   SALICYLATE, SERUM - Normal   SARS-COV-2/FLU A AND B/RSV BY PCR (GENEXPERT) - Normal    Narrative:      This test i patient. She was evaluated thoroughly, no underlying life-threatening medical emergency was identified. Patient does have severe anxiety and depression which are poorly controlled on her medications at this time.   Further assessed in conjunction with the

## 2021-02-25 NOTE — PROGRESS NOTES
02/25/21 [address]   Have you been practicing social distancing? Yes   Have you been wearing a mask when in the community? Yes   Are the people you live with following social distancing and wearing a mask?  Yes   Describe Pt li

## 2021-02-25 NOTE — BH LEVEL OF CARE ASSESSMENT
Crisis Evaluation Assessment    Larwancling Orf YOB: 1960   Age 61year old MRN [id number]   Location [address] Attending [place] MD Naun      Patient's legal sex: female  Patient identifies as: female  Misael go to sleep and not wake up? (past 30 days): Yes  2. Have you actually had any thoughts of killing yourself? (past 30 days): No              6. Have you ever done anything, started to do anything, or prepared to do anything to end your life? (lifetime):  Kelley Rowe hopelessness and worthlessness. Pt reports sleeping 9 hours a night and states \"that's the only thing Remeron is good for sleep\". Pt reports poor appetite but denies weight changes due to weight gain from Remeron and Zyprexa.  Pt denies AH/VH, and psychos Assessment (as applicable):  IBW Calculations  Weight: 140 lb  BMI (Calculated): 21.3  IBW LBS [place]: 140 LBS  IBW %: 100 %  IBW + 10%: 154 LBS  IBW - 10%: 126 LBS                                                                         Abuse Assessment:  A improvement with medication changes. Pt endorses thoughts of \"not wanting to like this\" but denies any plan or intent. Pt denies suicidal thoughts/plan/intent currently and is ilda for safety. Pt denies HI/AH/VH, and psychosis. CSSR score is 3.  Pt

## 2021-02-27 NOTE — ED INITIAL ASSESSMENT (HPI)
Pt here \"just want to die. \"  Pt reports anxiety. Pt on depression medications but they don't work.

## 2021-02-27 NOTE — BH LEVEL OF CARE REASSESSMENT
Level of Care Reassessment Note    The prior assessment completed on 2/25/21 has been reviewed. The level of care recommended was declined/postponed due to: worsening mental health symptoms.          Patient presents today for reassessment due to suicidal Problem  Protective Factors: \"Family\"  Past Suicidal Ideation: Attempt  Describe: Pt reports this past Thanksgiving she overdosed on her prescribed medication and was psychiatrically hospitalized  Family History or Personal Lived Experience of Loss or Ne and Affect  Mood or Feelings: Sadness;Depressed  Appropriateness of Affect: Congruent to mood  Attitude toward staff: Co-operative  Speech  Rate of Speech: Appropriate  Flow of Speech: Appropriate  Intensity of Volume: Ordinary  Cognition  Insight: Poor  F Provided: Call 911 in an Emergency;[facility] Crisis Line Number;Advised to call if condition worsens; Advised to call with questions     Primary Psychiatric Diagnosis  Depressive Disorders: Major Depressive Disorder, Recurrent Episode  Depressive Disorder Recurre at all times and wants her depression to improve.  She denies any chest pain, difficulty breathing, fever or any sick contacts or other complaints. \"                 Risk to Self or Others  Pt denies homicidal ideation at this time.  Pt denies AH/VH, and ps difficulty concentrating and feeling restless and will pace in her home. Pt reports this makes her fearful because she \"doesn't like what's going on in her body\". Pt reports hx of panic attacks but denies any recent episodes.  Pt reports she is in a \"[facility] psychiatrist Dr Marty Ortiz in late March but is having difficulty tolerating the increased symptoms and brought self to ED today to seek help.                  Relevant Social History:  Pt lives alone with her cat.  Pt denies hx of abuse/trauma and denies legal for treatment.   Thought Patterns  Clarity/Relevance: Coherent  Flow: Organized  Content: Ordinary  Level of Consciousness: Alert  Level of Consciousness: Alert  Behavior  Exhibited behavior: Appropriate to situation;Participated        Disposition: Partial Secondary Psychiatric Diagnoses  None  Pervasive Diagnoses  None   Pertinent Non-Psychiatric Diagnoses  See medical           Kelton Mac, LSW

## 2021-02-27 NOTE — ED NOTES
Pt belongings collected safely, Pt calm and cooperative, Pt given menu for food options, warm blankets, pillow and 2 gowns for comfort

## 2021-02-27 NOTE — ED PROVIDER NOTES
Patient Seen in: [facility] Emergency Department      History   Patient presents with:  Eval-P    Stated Complaint: eval p, SI    HPI/Subjective:   HPI    Patient is a 28-year-old female comes to emergency room for evaluation of suicidal ideation. Systems    Positive for stated complaint: eval p, SI  Other systems are as noted in HPI. Constitutional and vital signs reviewed. All other systems reviewed and negative except as noted above.     Physical Exam     ED Triage Vitals [02/27/21 1148]   B individuals suspected of respiratory viral infection consistent with COVID-19 by their healthcare provider. Signs and symptoms of respiratory viral infection due to SARS-CoV-2, influenza, and RSV can be similar.     Test performed using the Xpert Xpress ANAND

## 2021-02-28 NOTE — BH PROGRESS NOTE
Patient accepted by Dr Corinne German at [facility] in [facility], [address] [place] [address] Street. Request transport to be set up at 2230.

## 2021-02-28 NOTE — ED NOTES
RN to RN rpt given to [facility] in [place].  All questions answered, w/o difficulty; will await for accepting physician

## 2021-02-28 NOTE — ED PROVIDER NOTES
[facility] been [facility]/ad[facility]. Has remained calm here. Awaiting transport.

## 2021-02-28 NOTE — ED NOTES
Confirmed w/ [facility] facility that Pt has been accepted by Dr. Charley Batista. [facility] contacted for Pt transport. Pt updated to care plan.

## 2021-03-20 NOTE — BH LEVEL OF CARE ASSESSMENT
Crisis Evaluation Assessment    Sean Lewis YOB: 1960   Age 61year old MRN [id number]   Location [address] [facility] Attending Stan Bourgeois DO      Patient's legal sex: female  Patient identifies as: female  Patient's No  2. Have you actually had any thoughts of killing yourself? (past 30 days): No  6. Have you ever done anything, started to do anything, or prepared to do anything to end your life? (lifetime): Yes  7.  How long ago did you do any of these?: Between three 3/25.            Relevant Social History:  Pt reports sister and mother had depression. Pt is , lives alone with cat. Pt denies legal history.               EDP Assessment (as applicable):  IBW Calculations  Weight: 140 lb  BMI (Calculated): 21.3  I Lyrica, side effects include feeling lightheaded, can't drive or go for walks, \"makes my skin feel like a bad sunburn\", discomfort, not helping with anxiety. Pt reports she has an appointment with a new psychiatrist on 3/25.  Pt denies AH/VH, delusions, m

## 2021-03-20 NOTE — ED NOTES
Pt asking to speak with dr. Judson Henderson.  Pt requesting to stop lyrica and see her new doctor on Thursday and bypass lashawn assessment pt stating she has brought herself inpatient 5 times for same symptoms

## 2021-03-20 NOTE — ED INITIAL ASSESSMENT (HPI)
Patient to ED for c/o anxiety, lightheadedness and depression. Patient states one month ago she was at [facility] for inpatient psychiatric treatment and started on lyrica. Patient c/o lightheadedness and no change in anxiety.  States she is depressed

## 2021-03-20 NOTE — ED PROVIDER NOTES
Patient Seen in: [facility] Emergency Department      History   Patient presents with:   Anxiety/Panic attack    Stated Complaint: anxiety, denies si/hi    HPI/Subjective:   HPI    61-year-old female with history of depression and anxiety presents em since 11/25/2020    Vaping Use      Vaping Use: Former        Substances: Nicotine        Devices: Disposable    Alcohol use: Never      Comment: very rare    Drug use:  No             Review of Systems    Positive for stated complaint: anxiety, denies si/h The following orders were created for panel order CBC WITH DIFFERENTIAL WITH PLATELET.   Procedure                               Abnormality         Status                     ---------                               -----------         ------ discussed the case with the patient and they had no questions, complaints, or concerns. Patient was comfortable going home.                    Disposition and Plan     Clinical Impression:  Anxiety  (primary encounter diagnosis)  Depression, unspecified de

## 2021-03-21 NOTE — BH LEVEL OF CARE REASSESSMENT
Level of Care Reassessment Note    The prior assessment completed on 03/20/2021 has been reviewed.   The level of care recommended was declined/postponed due to:   Refused Treatment Reason: Other Refused Treatment Other Reason: Pt reports PHP programs have overdosing on sleeping pills Thanksgiving day 2020. Protective Factors: Pt reports having many persons of support including son, sister, friends, and Mandaen family.   Past Suicidal Ideation: Attempt  Describe: Pt reports overdosing on sleeping pills Thanks drug/alcohol abuse. Pt reports having difficulty coping with anxiety to do things she used to find enjoyable like volunteering with her [employer].  Pt reports starting to see EMDR therapist, Jonathan Vasquez, and has appointment on 03/22/2021 at 7:00pm. Pt also has u she has an appointment with a new psychiatrist on 3/25. Collateral   H&P   61-year-old female with history of depression and anxiety presents emergency room with complaint of worsening anxiety.  Patient reports she was recently admitted to [facility] to Means   Has access to means to attempt suicide or harm others or property: (Pt denies SI)   Access to Firearm/Weapon: No   Do you have a firearm owner ID card?: No   Protective Factors:   Protective Factors: Pt reports lucas, her son and sister, friends Contact: Direct   Psychomotor Behavior   Gait/Movement: Other (comment) (Pt is in hospital bed)   Abnormal movements: None   Posture:  Other (comment) (Pt is in hospital bed)   Rate of Movement: Other (comment) (Pt is in hospital bed)   Mood and Affect   Mo Zyprexa, Remeron, Lyrica and Xanax PRN. Pt reports seeing a counselor for EMDR, twice a week. Pt reports new psychiatrist, first appointment on 3/25.        Risk/Protective Factors   Protective Factors: Pt reports lucas, her son and sister, friends and chur

## 2021-03-21 NOTE — ED PROVIDER NOTES
Patient Seen in: [facility] Emergency Department      History   Patient presents with:   Anxiety/Panic attack    Stated Complaint: Anxiety    HPI/Subjective:   HPI    70-year-old female with history of depression and anxiety presents for evaluation o Never      Comment: very rare    Drug use: No             Review of Systems    Positive for stated complaint: Anxiety  Other systems are as noted in HPI. Constitutional and vital signs reviewed.       All other systems reviewed and negative except as noted (RSV) viral RNA in nasopharyngeal swab from individuals suspected of respiratory viral infection consistent with COVID-19 by their healthcare provider. Signs and symptoms of respiratory viral infection due to SARS-CoV-2, influenza, and RSV can be similar. visit. Follow-up:  No follow-up provider specified.         Medications Prescribed:  Current Discharge Medication List

## 2021-03-21 NOTE — ED INITIAL ASSESSMENT (HPI)
Pt was seen in ED last night for anxiety. Here today bc she cannot wait for her Thursday psych appt. Requests, \"I have to go in-house to be cared for to get this under control. \" Denies SI/HI

## 2021-03-21 NOTE — ED NOTES
Pt rpts she took 1mg ativan and her lyrica this morning. Rpts she feels like a zombie right now, but has breakthrough anxiety. MD notified.

## 2021-03-21 NOTE — ED NOTES
[facility] reports patient will be discharged home.  Pt reports he son drove her in

## 2021-03-22 NOTE — ED NOTES
Pt belongings placed in 2 smart safe bags and given to security to be locked up. Bag #'s [id number] and [id number].

## 2021-03-22 NOTE — BH LEVEL OF CARE ASSESSMENT
Crisis Evaluation Assessment    Peola Job YOB: 1960   Age 61year old MRN [id number]   Location [address] [facility] Attending Harrel Hashimoto, MD      Patient's legal sex: female  Patient identifies as: female  Patient (past 30 days): No  3. Have you been thinking about how you might kill yourself? (past 30 days): No  4. Have you had these thoughts and had some intention of acting on them? (past 30 days): No  5a.  Have you started to work out or worked out the details of early. Juma Abel reports taking Remeron which helps her sleep, weight gained from medications. Substance Use:  Juma Abel denies substance use, current or past.               Functional Achievement:   Juma Abel retired from [employer].  Juma Abel reports she Fully Independent  Toileting  Patient Incontinence: None  Special Considerations  Patient has pressures sores, surgical wounds, bandages/dressings?: No  Patient uses any kind of pump?: No  Does the patient need a BiPAP or CPAP?: No  Intellectual disability Patterns  Clarity/Relevance: Coherent;Logical;Relevant to topic  Flow: Organized  Content: Ordinary  Level of Consciousness: Alert  Level of Consciousness: Alert  Behavior  Exhibited behavior: Demanding;Participated      Disposition:    Assessment Summary: Diagnoses    Pervasive Diagnoses    Pertinent Non-Psychiatric Diagnoses          Kaylie F

## 2021-03-22 NOTE — ED PROVIDER NOTES
Patient Seen in: [facility] Emergency Department      History   Patient presents with:  Eval-P    Stated Complaint: eval p, SI    HPI/Subjective:   HPI    Patient is a 60-year-old female, presenting for evaluation of \"uncontrollable anxiety\" and s rare    Drug use: No             Review of Systems    Positive for stated complaint: eval p, SI  Other systems are as noted in HPI. Constitutional and vital signs reviewed. All other systems reviewed and negative except as noted above.     Physical Ex Normal   SARS-COV-2/FLU A AND B/RSV BY PCR (GENEXPERT) - Normal    Narrative:      This test is intended for the qualitative detection and differentiation of SARS-CoV-2, influenza A, influenza B, and respiratory syncytial virus (RSV) viral RNA in nasopharyn disposition based on [facility] recommendation.                      Disposition and Plan     Clinical Impression:  Anxiety  (primary encounter diagnosis)  Depression, unspecified depression type  Suicidal ideation    Disposition:  Psychiatric transfer  3/

## 2021-03-22 NOTE — ED INITIAL ASSESSMENT (HPI)
Per patient, \"I can't live another day like this. My anxiety is out of control. I don't want to wake up anymore. Ativan is not managing my anxiety and I am not the least bit peaceful. \" AAO x 4.      Patient admits she wishes she was dead, but does not wan

## 2021-03-22 NOTE — ED NOTES
The patients sister called to ask to speak to the . PCT informed her they would want to speak to her when their assessment begins. Her name is Maggie Pastor and call back number is [phone].  Kiera Gallardo stated to this PCT over the phone \"This is

## 2021-03-22 NOTE — ED NOTES
Spoke with Dr. Zaynab Rose about patient's status. He and agree patient will most likely need seclusion. MD is going to bedside.

## 2021-03-23 PROBLEM — F33.2 MAJOR DEPRESSIVE DISORDER, RECURRENT EPISODE, SEVERE (HCC): Chronic | Status: ACTIVE | Noted: 2021-03-23

## 2021-03-23 PROBLEM — F33.2 MAJOR DEPRESSIVE DISORDER, RECURRENT EPISODE, SEVERE (HCC): Status: ACTIVE | Noted: 2021-03-23

## 2021-03-23 PROBLEM — F40.01 PANIC DISORDER WITH AGORAPHOBIA: Chronic | Status: ACTIVE | Noted: 2021-03-23

## 2021-03-23 NOTE — PROGRESS NOTES
03/22/21 1546   COVID Exposure Risk Screening   Have you been practicing social distancing? Yes   Have you been wearing a mask when in the community? Yes   Are the people you live with following social distancing and wearing a mask?    (Arabella Croft

## 2021-03-23 NOTE — ED PROVIDER NOTES
Patient was up and active throughout my shift. She did request Ativan and was feeling nauseous and received some Zofran as well. She was evaluated by [facility] and further stated that she is nonfunctional with her anxiety.   This is her third visit within 67 ho

## 2021-03-25 ENCOUNTER — HOSPITAL ENCOUNTER (EMERGENCY)
Facility: HOSPITAL | Age: 61
Discharge: NURSING, REHAB OR LONG-TERM CARE | End: 2021-03-25
Attending: EMERGENCY MEDICINE
Payer: COMMERCIAL

## 2021-03-25 ENCOUNTER — APPOINTMENT (OUTPATIENT)
Dept: BEHAVIORAL HEALTH | Age: 61
End: 2021-03-25

## 2021-03-25 ENCOUNTER — APPOINTMENT (OUTPATIENT)
Dept: CT IMAGING | Facility: HOSPITAL | Age: 61
End: 2021-03-25
Attending: EMERGENCY MEDICINE
Payer: COMMERCIAL

## 2021-03-25 VITALS
SYSTOLIC BLOOD PRESSURE: 123 MMHG | BODY MASS INDEX: 21.22 KG/M2 | WEIGHT: 140 LBS | HEIGHT: 68 IN | TEMPERATURE: 98 F | HEART RATE: 70 BPM | RESPIRATION RATE: 18 BRPM | DIASTOLIC BLOOD PRESSURE: 58 MMHG | OXYGEN SATURATION: 97 %

## 2021-03-25 DIAGNOSIS — R11.0 NAUSEA: Primary | ICD-10-CM

## 2021-03-25 DIAGNOSIS — R19.7 DIARRHEA, UNSPECIFIED TYPE: ICD-10-CM

## 2021-03-25 DIAGNOSIS — R42 DIZZINESS: ICD-10-CM

## 2021-03-25 LAB
ALBUMIN SERPL-MCNC: 3.4 G/DL (ref 3.4–5)
ALBUMIN/GLOB SERPL: 0.9 {RATIO} (ref 1–2)
ALP LIVER SERPL-CCNC: 59 U/L
ALT SERPL-CCNC: 21 U/L
ANION GAP SERPL CALC-SCNC: 2 MMOL/L (ref 0–18)
AST SERPL-CCNC: 20 U/L (ref 15–37)
BASOPHILS # BLD AUTO: 0.02 X10(3) UL (ref 0–0.2)
BASOPHILS NFR BLD AUTO: 0.5 %
BILIRUB SERPL-MCNC: 0.4 MG/DL (ref 0.1–2)
BILIRUB UR QL STRIP.AUTO: NEGATIVE
BUN BLD-MCNC: 14 MG/DL (ref 7–18)
BUN/CREAT SERPL: 14.3 (ref 10–20)
C DIFF TOX B STL QL: NEGATIVE
CALCIUM BLD-MCNC: 9.3 MG/DL (ref 8.5–10.1)
CHLORIDE SERPL-SCNC: 107 MMOL/L (ref 98–112)
CLARITY UR REFRACT.AUTO: CLEAR
CO2 SERPL-SCNC: 30 MMOL/L (ref 21–32)
COLOR UR AUTO: YELLOW
CREAT BLD-MCNC: 0.98 MG/DL
DEPRECATED RDW RBC AUTO: 42.3 FL (ref 35.1–46.3)
EOSINOPHIL # BLD AUTO: 0.17 X10(3) UL (ref 0–0.7)
EOSINOPHIL NFR BLD AUTO: 4 %
ERYTHROCYTE [DISTWIDTH] IN BLOOD BY AUTOMATED COUNT: 12.2 % (ref 11–15)
GLOBULIN PLAS-MCNC: 3.7 G/DL (ref 2.8–4.4)
GLUCOSE BLD-MCNC: 91 MG/DL (ref 70–99)
GLUCOSE UR STRIP.AUTO-MCNC: NEGATIVE MG/DL
HCT VFR BLD AUTO: 42.6 %
HGB BLD-MCNC: 14.5 G/DL
IMM GRANULOCYTES # BLD AUTO: 0.01 X10(3) UL (ref 0–1)
IMM GRANULOCYTES NFR BLD: 0.2 %
KETONES UR STRIP.AUTO-MCNC: NEGATIVE MG/DL
LEUKOCYTE ESTERASE UR QL STRIP.AUTO: NEGATIVE
LYMPHOCYTES # BLD AUTO: 0.94 X10(3) UL (ref 1–4)
LYMPHOCYTES NFR BLD AUTO: 22.1 %
M PROTEIN MFR SERPL ELPH: 7.1 G/DL (ref 6.4–8.2)
MCH RBC QN AUTO: 32.3 PG (ref 26–34)
MCHC RBC AUTO-ENTMCNC: 34 G/DL (ref 31–37)
MCV RBC AUTO: 94.9 FL
MONOCYTES # BLD AUTO: 0.46 X10(3) UL (ref 0.1–1)
MONOCYTES NFR BLD AUTO: 10.8 %
NEUTROPHILS # BLD AUTO: 2.66 X10 (3) UL (ref 1.5–7.7)
NEUTROPHILS # BLD AUTO: 2.66 X10(3) UL (ref 1.5–7.7)
NEUTROPHILS NFR BLD AUTO: 62.4 %
NITRITE UR QL STRIP.AUTO: NEGATIVE
OSMOLALITY SERPL CALC.SUM OF ELEC: 288 MOSM/KG (ref 275–295)
PH UR STRIP.AUTO: 7 [PH] (ref 5–8)
PLATELET # BLD AUTO: 263 10(3)UL (ref 150–450)
POTASSIUM SERPL-SCNC: 4.4 MMOL/L (ref 3.5–5.1)
PROT UR STRIP.AUTO-MCNC: NEGATIVE MG/DL
RBC # BLD AUTO: 4.49 X10(6)UL
RBC UR QL AUTO: NEGATIVE
SARS-COV-2 RNA RESP QL NAA+PROBE: NOT DETECTED
SODIUM SERPL-SCNC: 139 MMOL/L (ref 136–145)
SP GR UR STRIP.AUTO: 1.01 (ref 1–1.03)
UROBILINOGEN UR STRIP.AUTO-MCNC: <2 MG/DL
WBC # BLD AUTO: 4.3 X10(3) UL (ref 4–11)

## 2021-03-25 PROCEDURE — 85025 COMPLETE CBC W/AUTO DIFF WBC: CPT | Performed by: EMERGENCY MEDICINE

## 2021-03-25 PROCEDURE — 93005 ELECTROCARDIOGRAM TRACING: CPT

## 2021-03-25 PROCEDURE — 96375 TX/PRO/DX INJ NEW DRUG ADDON: CPT

## 2021-03-25 PROCEDURE — 99284 EMERGENCY DEPT VISIT MOD MDM: CPT

## 2021-03-25 PROCEDURE — 87045 FECES CULTURE AEROBIC BACT: CPT | Performed by: EMERGENCY MEDICINE

## 2021-03-25 PROCEDURE — 96374 THER/PROPH/DIAG INJ IV PUSH: CPT

## 2021-03-25 PROCEDURE — 87046 STOOL CULTR AEROBIC BACT EA: CPT | Performed by: EMERGENCY MEDICINE

## 2021-03-25 PROCEDURE — 96361 HYDRATE IV INFUSION ADD-ON: CPT

## 2021-03-25 PROCEDURE — 82272 OCCULT BLD FECES 1-3 TESTS: CPT | Performed by: EMERGENCY MEDICINE

## 2021-03-25 PROCEDURE — 81003 URINALYSIS AUTO W/O SCOPE: CPT | Performed by: EMERGENCY MEDICINE

## 2021-03-25 PROCEDURE — 87427 SHIGA-LIKE TOXIN AG IA: CPT | Performed by: EMERGENCY MEDICINE

## 2021-03-25 PROCEDURE — 99285 EMERGENCY DEPT VISIT HI MDM: CPT

## 2021-03-25 PROCEDURE — 87493 C DIFF AMPLIFIED PROBE: CPT | Performed by: EMERGENCY MEDICINE

## 2021-03-25 PROCEDURE — 80053 COMPREHEN METABOLIC PANEL: CPT | Performed by: EMERGENCY MEDICINE

## 2021-03-25 PROCEDURE — 70450 CT HEAD/BRAIN W/O DYE: CPT | Performed by: EMERGENCY MEDICINE

## 2021-03-25 PROCEDURE — 93010 ELECTROCARDIOGRAM REPORT: CPT

## 2021-03-25 RX ORDER — ACETAMINOPHEN 500 MG
1000 TABLET ORAL ONCE
Status: COMPLETED | OUTPATIENT
Start: 2021-03-25 | End: 2021-03-25

## 2021-03-25 RX ORDER — LORAZEPAM 2 MG/ML
1 INJECTION INTRAMUSCULAR ONCE
Status: COMPLETED | OUTPATIENT
Start: 2021-03-25 | End: 2021-03-25

## 2021-03-25 RX ORDER — ONDANSETRON 2 MG/ML
4 INJECTION INTRAMUSCULAR; INTRAVENOUS ONCE
Status: COMPLETED | OUTPATIENT
Start: 2021-03-25 | End: 2021-03-25

## 2021-03-25 NOTE — ED PROVIDER NOTES
Patient Seen in: [facility] Emergency Department      History   Patient presents with:  Altered Mental Status    Stated Complaint: AMS from [facility]    HPI/Subjective:   HPI    Patient is a 20-year-old female presents for evaluation from [facility] reviewed. All other systems reviewed and negative except as noted above.     Physical Exam     ED Triage Vitals [03/25/21 1014]   BP (!) 111/95   Pulse 75   Resp 18   Temp 97.5 °F (36.4 °C)   Temp src Temporal   SpO2 100 %   O2 Device None (Room air) DRAW LIGHT GREEN   RAINBOW DRAW GOLD   C. DIFFICILE(TOXIGENIC)PCR   OCCULT BLOOD, STOOL   STOOL CULTURE W/SHIGATOXIN    Narrative: The following orders were created for panel order STOOL CULTURE W/SHIGATOXIN.   Procedure                               Ab pm    Follow-up:  DO Matthew Srivastava Lee [id number] [phone]    In 1 week            Medications Prescribed:  Current Discharge Medication List

## 2021-03-25 NOTE — ED INITIAL ASSESSMENT (HPI)
Patient to ED via EMS from [facility]. Reports the patient was anxious this morning, a Klonopin was given. After the patient was unsteady on her feet, assisted to the ground. After the patient was responding intermittently.   Upon EMS arrival patient was

## 2021-03-25 NOTE — ED NOTES
Patient remains stable, sitting up in room, eating lunch, in NAD. Paramedics here to take patient to [facility].

## 2021-03-25 NOTE — ED NOTES
Report given to KYLE HENAO at [facility], aware we continue to await Medical MD to call back. Patient remains awake and alert. Given coffee as requested for headache. Aware and agrees with plan for return to [facility].   Tory See from [facility] remains a

## 2021-03-26 LAB
ATRIAL RATE: 71 BPM
P AXIS: 66 DEGREES
P-R INTERVAL: 134 MS
Q-T INTERVAL: 382 MS
QRS DURATION: 82 MS
QTC CALCULATION (BEZET): 415 MS
R AXIS: 79 DEGREES
T AXIS: 70 DEGREES
VENTRICULAR RATE: 71 BPM

## 2021-04-01 PROBLEM — F33.9 MAJOR DEPRESSION, RECURRENT (HCC): Status: RESOLVED | Noted: 2020-12-12 | Resolved: 2021-04-01

## 2021-04-01 PROBLEM — F33.2 MDD (MAJOR DEPRESSIVE DISORDER), RECURRENT EPISODE, SEVERE (HCC): Status: RESOLVED | Noted: 2020-11-28 | Resolved: 2021-04-01

## 2021-04-01 PROBLEM — F32.2 MDD (MAJOR DEPRESSIVE DISORDER), SEVERE (HCC): Status: RESOLVED | Noted: 2020-11-03 | Resolved: 2021-04-01

## 2021-04-04 PROBLEM — R00.2 PALPITATIONS: Status: RESOLVED | Noted: 2020-10-28 | Resolved: 2021-04-04

## 2021-04-04 PROBLEM — Z98.890 S/P ECT (ELECTROCONVULSIVE THERAPY): Status: RESOLVED | Noted: 2020-12-02 | Resolved: 2021-04-04

## 2021-04-08 ENCOUNTER — LAB REQUISITION (OUTPATIENT)
Dept: ADMINISTRATIVE | Age: 61
End: 2021-04-08
Payer: COMMERCIAL

## 2021-04-08 DIAGNOSIS — F39 MOOD DISORDER (HCC): ICD-10-CM

## 2021-04-08 PROCEDURE — 81003 URINALYSIS AUTO W/O SCOPE: CPT | Performed by: OTHER

## 2021-04-09 PROCEDURE — 83735 ASSAY OF MAGNESIUM: CPT | Performed by: OTHER

## 2021-04-09 PROCEDURE — 82150 ASSAY OF AMYLASE: CPT | Performed by: OTHER

## 2021-04-09 PROCEDURE — 84100 ASSAY OF PHOSPHORUS: CPT | Performed by: OTHER

## 2021-04-09 PROCEDURE — 85025 COMPLETE CBC W/AUTO DIFF WBC: CPT | Performed by: OTHER

## 2021-04-09 PROCEDURE — 80053 COMPREHEN METABOLIC PANEL: CPT | Performed by: OTHER

## 2021-04-09 PROCEDURE — 84443 ASSAY THYROID STIM HORMONE: CPT | Performed by: OTHER

## 2021-04-09 PROCEDURE — 36415 COLL VENOUS BLD VENIPUNCTURE: CPT | Performed by: OTHER

## 2021-04-22 ENCOUNTER — LAB REQUISITION (OUTPATIENT)
Dept: ADMINISTRATIVE | Age: 61
End: 2021-04-22
Payer: COMMERCIAL

## 2021-04-22 DIAGNOSIS — F41.9 ANXIETY: ICD-10-CM

## 2021-04-23 PROCEDURE — 84100 ASSAY OF PHOSPHORUS: CPT | Performed by: NURSE PRACTITIONER

## 2021-04-23 PROCEDURE — 84484 ASSAY OF TROPONIN QUANT: CPT | Performed by: NURSE PRACTITIONER

## 2021-04-23 PROCEDURE — 83735 ASSAY OF MAGNESIUM: CPT | Performed by: NURSE PRACTITIONER

## 2021-04-23 PROCEDURE — 80048 BASIC METABOLIC PNL TOTAL CA: CPT | Performed by: NURSE PRACTITIONER

## 2021-04-23 PROCEDURE — 36415 COLL VENOUS BLD VENIPUNCTURE: CPT | Performed by: NURSE PRACTITIONER

## 2021-04-29 ENCOUNTER — APPOINTMENT (OUTPATIENT)
Dept: BEHAVIORAL HEALTH | Age: 61
End: 2021-04-29

## 2021-05-09 ENCOUNTER — HOSPITAL ENCOUNTER (EMERGENCY)
Facility: HOSPITAL | Age: 61
Discharge: HOME | End: 2021-05-09
Attending: EMERGENCY MEDICINE
Payer: COMMERCIAL

## 2021-05-09 ENCOUNTER — APPOINTMENT (OUTPATIENT)
Dept: CT IMAGING | Facility: HOSPITAL | Age: 61
End: 2021-05-09
Attending: EMERGENCY MEDICINE
Payer: COMMERCIAL

## 2021-05-09 ENCOUNTER — APPOINTMENT (OUTPATIENT)
Dept: GENERAL RADIOLOGY | Facility: HOSPITAL | Age: 61
End: 2021-05-09
Attending: EMERGENCY MEDICINE
Payer: COMMERCIAL

## 2021-05-09 VITALS
SYSTOLIC BLOOD PRESSURE: 126 MMHG | DIASTOLIC BLOOD PRESSURE: 66 MMHG | HEART RATE: 70 BPM | BODY MASS INDEX: 21.97 KG/M2 | HEIGHT: 67 IN | TEMPERATURE: 99 F | WEIGHT: 140 LBS | RESPIRATION RATE: 16 BRPM | OXYGEN SATURATION: 98 %

## 2021-05-09 DIAGNOSIS — F41.9 ANXIETY: ICD-10-CM

## 2021-05-09 DIAGNOSIS — R51.9 NONINTRACTABLE HEADACHE, UNSPECIFIED CHRONICITY PATTERN, UNSPECIFIED HEADACHE TYPE: Primary | ICD-10-CM

## 2021-05-09 PROCEDURE — 93005 ELECTROCARDIOGRAM TRACING: CPT

## 2021-05-09 PROCEDURE — 84484 ASSAY OF TROPONIN QUANT: CPT | Performed by: EMERGENCY MEDICINE

## 2021-05-09 PROCEDURE — 85379 FIBRIN DEGRADATION QUANT: CPT | Performed by: EMERGENCY MEDICINE

## 2021-05-09 PROCEDURE — 96374 THER/PROPH/DIAG INJ IV PUSH: CPT

## 2021-05-09 PROCEDURE — 80053 COMPREHEN METABOLIC PANEL: CPT | Performed by: EMERGENCY MEDICINE

## 2021-05-09 PROCEDURE — 96375 TX/PRO/DX INJ NEW DRUG ADDON: CPT

## 2021-05-09 PROCEDURE — 99285 EMERGENCY DEPT VISIT HI MDM: CPT

## 2021-05-09 PROCEDURE — 70450 CT HEAD/BRAIN W/O DYE: CPT | Performed by: EMERGENCY MEDICINE

## 2021-05-09 PROCEDURE — 71045 X-RAY EXAM CHEST 1 VIEW: CPT | Performed by: EMERGENCY MEDICINE

## 2021-05-09 PROCEDURE — 93010 ELECTROCARDIOGRAM REPORT: CPT

## 2021-05-09 PROCEDURE — 85025 COMPLETE CBC W/AUTO DIFF WBC: CPT | Performed by: EMERGENCY MEDICINE

## 2021-05-09 RX ORDER — LORAZEPAM 2 MG/ML
1 INJECTION INTRAMUSCULAR ONCE
Status: COMPLETED | OUTPATIENT
Start: 2021-05-09 | End: 2021-05-09

## 2021-05-09 RX ORDER — CLONAZEPAM 0.5 MG/1
0.5 TABLET ORAL 3 TIMES DAILY PRN
Qty: 10 TABLET | Refills: 0 | Status: ON HOLD | OUTPATIENT
Start: 2021-05-09 | End: 2021-05-14

## 2021-05-09 RX ORDER — ACETAMINOPHEN 500 MG
1000 TABLET ORAL ONCE
Status: COMPLETED | OUTPATIENT
Start: 2021-05-09 | End: 2021-05-09

## 2021-05-09 RX ORDER — ONDANSETRON 2 MG/ML
4 INJECTION INTRAMUSCULAR; INTRAVENOUS ONCE
Status: COMPLETED | OUTPATIENT
Start: 2021-05-09 | End: 2021-05-09

## 2021-05-09 NOTE — ED PROVIDER NOTES
Patient Seen in: [facility] Emergency Department      History   Patient presents with:  Hypertension    Stated Complaint: HIGH B/P    HPI/Subjective:   HPI    40-year-old female with past medical history of anxiety, pulmonary embolism, COPD presents Substances: Nicotine        Devices: Disposable    Alcohol use: Never      Comment: very rare    Drug use: No             Review of Systems    Positive for stated complaint: HIGH B/P  Other systems are as noted in HPI.   Constitutional and vital signs revie components within normal limits   CBC W/ DIFFERENTIAL - Abnormal; Notable for the following components:    Lymphocyte Absolute 0.94 (*)     All other components within normal limits   D-DIMER - Normal   TROPONIN I - Normal   RAPID SARS-COV-2 BY PCR - Brunilda Zavala No evidence for fracture or osseous abnormality. OTHER:             No significant change. CONCLUSION:  No acute intracranial pathology.    Dictated by (CST): Glo Patino MD on 5/09/2021 at 10:28 AM     Finalized by (CST): Glo Patino MD worked however stopped working after a week. She then was on Pristiq which was increased from 25 to 50 mg. She feels like the increase in Pristiq may be contributing to her anxiety.   She spoke with her primary care doctor who increased her dose to 62.5 m

## 2021-05-09 NOTE — ED INITIAL ASSESSMENT (HPI)
Pt to ED with c/o headache that started this am. Pt sts BP at home was 220/100. Pt hyperventilating, in fetal position on arrival to room. Pt sts on Thursday she was released from a 30 day inpatient program for anxiety.  Pt encouraged to use coping skills t

## 2021-05-09 NOTE — ED QUICK NOTES
Pt call light on, requesting black coffee. Pt informed of discharge. Sts family is on there way to pick her up.  Eta 20 mins

## 2021-05-09 NOTE — ED QUICK NOTES
Received call from [facility] EMERGENCY [facility], TL, sts son is here to  pt. DC instructions and [address] reviewed with pt. No distress noted. Pt denies any further needs at this time. Pt finished coffee.  Steady gait noted out of ER

## 2021-05-09 NOTE — ED QUICK NOTES
Pt continues to hyperventilate. Emotional support provided. Pt attempting to get out of bed. Educated on pure wick. Reassured pt BP is WNL.  Cont pulse ox in place

## 2021-05-10 PROBLEM — F32.9 MAJOR DEPRESSIVE DISORDER: Status: ACTIVE | Noted: 2021-05-10

## 2021-05-10 NOTE — ED NOTES
Shiloh Gandara RN made aware of pt's acceptance to [facility] and that a petition is needed.

## 2021-05-10 NOTE — BH LEVEL OF CARE ASSESSMENT
Crisis Evaluation Assessment    Melquiades Rodriguez YOB: 1960   Age 61year old MRN [id number]   Location [address] [facility] [address] Attending Ania Montano MD      Patient's legal sex: female  Patient identifies as: female  Carmina (past 30 days): No  5b. Do you intend to carry out this plan? (past 30 days): No  6. Have you ever done anything, started to do anything, or prepared to do anything to end your life? (lifetime): Yes  7.  How long ago did you do any of these?: Between three continues to worsen. Pt reports she has had medication changes recently stating she was prescribed pristiq which has been giving pt SI thoughts. Pt reports tightness in chest, shaking, racing thoughts, nausea, stomach ache, and hyperventilating.   Pt repo history.             Reece and Complex (as applicable):  Geriatric Functioning  Dementia Symptoms Observed/Expressed: No  Current Living Situation  Current Living Situation: Private Residence  [employer]  Is the patient verbal?: Yes  Vision or hearing c Food dominates your life?: No  SCOFF Score: 0                                                                      Abuse Assessment:  Abuse Assessment  Physical Abuse: Denies  Verbal Abuse: Denies  Sexual Abuse: Denies  Neglect: Denies  Does anyone say or her admission to [facility] her anxiety continues to worsen. Pt reports she has had medication changes recently. Pt reports she had been prescribed pristiq which has been giving pt SI thoughts.   Pt reports tightness in chest, shaking, racing thoughts, nausea, st

## 2021-05-10 NOTE — ED NOTES
Pt signed her rights and voluntary. Updated on plan of care to [facility] and that she will be on infection safety until 5/13/21. Pt expressed no issues or concerns at this time.

## 2021-05-10 NOTE — PROGRESS NOTES
05/10/21 Lianna [address]   Have you been practicing social distancing? Yes   Have you been wearing a mask when in the community?  Yes   Are the people you live with following social distancing and wearing a mask?   (n/a)   Describe p

## 2021-05-10 NOTE — ED INITIAL ASSESSMENT (HPI)
Pt here for suicidal thoughts. Pt notes she cant control her anxiety. Pt says her medications are not working. Pt says she does not have a plan but dose not want to hurt herself. Pt was here yesterday and thought the thoughts would go away.  Pt was here for

## 2021-05-10 NOTE — ED PROVIDER NOTES
Patient Seen in: [facility] Emergency Department      History   Patient presents with:  Eval-P    Stated Complaint: eval p    HPI/Subjective:   HPI    20-year-old female who has a history of anxiety and depression comes to the hospital with a compla complaint: eval p  Other systems are as noted in HPI. Constitutional and vital signs reviewed. All other systems reviewed and negative except as noted above.     Physical Exam     ED Triage Vitals [05/10/21 1025]   /73   Pulse 87   Resp 18   Tem differentiation of SARS-CoV-2, influenza A, influenza B, and respiratory syncytial virus (RSV) viral RNA in nasopharyngeal swab from individuals suspected of respiratory viral infection consistent with COVID-19 by their healthcare provider.  Signs and sympt through the brain. Dose reduction techniques were used. Dose information is transmitted to the [facility] [employer] [facility]) NR[facility] ([address]) which includes the Dose Index Registry.   PATIENT STATED HISTORY: (As transcribed by T petition to be admitted for her suicidal ideation at this time         MDM      We are awaiting psychiatric transfer                             Disposition and Plan     Clinical Impression:  Suicidal ideation  (primary encounter diagnosis)     Disposition

## 2021-05-10 NOTE — ED NOTES
Pt has been accepted to [address] [place] [address] under Dr. Sapna Beckman. Pt will go to room 916A and will be on infection safety until 5/13/21 per Jonas PEREZ Waiting on SBAR, nurse to nurse, and transportation.

## 2021-05-10 NOTE — ED NOTES
SBAR completed with VERONICA Navarrete at [facility].  Waiting on nurse to nurse and transportation

## 2021-05-20 ENCOUNTER — BEHAVIORAL HEALTH (OUTPATIENT)
Dept: BEHAVIORAL HEALTH | Age: 61
End: 2021-05-20

## 2021-05-20 DIAGNOSIS — F41.1 GENERALIZED ANXIETY DISORDER: Primary | ICD-10-CM

## 2021-05-20 DIAGNOSIS — F33.1 MODERATE EPISODE OF RECURRENT MAJOR DEPRESSIVE DISORDER (CMD): ICD-10-CM

## 2021-05-20 DIAGNOSIS — F41.0 PANIC DISORDER (EPISODIC PAROXYSMAL ANXIETY): ICD-10-CM

## 2021-05-20 PROCEDURE — 90792 PSYCH DIAG EVAL W/MED SRVCS: CPT | Performed by: PSYCHIATRY & NEUROLOGY

## 2021-05-20 RX ORDER — MIRTAZAPINE 45 MG/1
45 TABLET, FILM COATED ORAL
COMMUNITY
Start: 2021-05-14 | End: 2021-05-20 | Stop reason: SDUPTHER

## 2021-05-20 RX ORDER — MIRTAZAPINE 30 MG/1
30 TABLET, FILM COATED ORAL NIGHTLY
Qty: 30 TABLET | Refills: 3 | Status: SHIPPED | OUTPATIENT
Start: 2021-05-20 | End: 2021-06-14 | Stop reason: SDUPTHER

## 2021-05-20 RX ORDER — ESCITALOPRAM OXALATE 20 MG/1
20 TABLET ORAL
COMMUNITY
Start: 2021-05-15 | End: 2021-05-20 | Stop reason: SDUPTHER

## 2021-05-20 RX ORDER — CLONAZEPAM 1 MG/1
1 TABLET ORAL
COMMUNITY
Start: 2021-05-14 | End: 2021-05-20 | Stop reason: SDUPTHER

## 2021-05-20 RX ORDER — CLONAZEPAM 1 MG/1
1 TABLET ORAL 3 TIMES DAILY PRN
Qty: 90 TABLET | Refills: 3 | Status: SHIPPED | OUTPATIENT
Start: 2021-05-20 | End: 2021-06-14 | Stop reason: CLARIF

## 2021-05-20 RX ORDER — ESCITALOPRAM OXALATE 20 MG/1
30 TABLET ORAL NIGHTLY
Qty: 45 TABLET | Refills: 3 | Status: SHIPPED | OUTPATIENT
Start: 2021-05-20 | End: 2021-05-24 | Stop reason: SDUPTHER

## 2021-05-24 ENCOUNTER — TELEPHONE (OUTPATIENT)
Dept: BEHAVIORAL HEALTH | Age: 61
End: 2021-05-24

## 2021-05-24 RX ORDER — ESCITALOPRAM OXALATE 20 MG/1
20 TABLET ORAL NIGHTLY
Qty: 30 TABLET | Refills: 3 | Status: SHIPPED | OUTPATIENT
Start: 2021-05-24 | End: 2021-05-28 | Stop reason: CLARIF

## 2021-05-25 ENCOUNTER — TELEPHONE (OUTPATIENT)
Dept: BEHAVIORAL HEALTH | Age: 61
End: 2021-05-25

## 2021-05-27 ENCOUNTER — TELEPHONE (OUTPATIENT)
Dept: SCHEDULING | Age: 61
End: 2021-05-27

## 2021-05-28 ENCOUNTER — TELEPHONE (OUTPATIENT)
Dept: BEHAVIORAL HEALTH | Age: 61
End: 2021-05-28

## 2021-05-28 ENCOUNTER — TELEPHONE (OUTPATIENT)
Dept: SCHEDULING | Age: 61
End: 2021-05-28

## 2021-05-28 RX ORDER — CITALOPRAM 40 MG/1
20 TABLET ORAL DAILY
Qty: 30 TABLET | Refills: 2 | Status: SHIPPED | OUTPATIENT
Start: 2021-05-28 | End: 2021-06-07 | Stop reason: SDUPTHER

## 2021-06-07 ENCOUNTER — TELEPHONE (OUTPATIENT)
Dept: BEHAVIORAL HEALTH | Age: 61
End: 2021-06-07

## 2021-06-07 RX ORDER — CITALOPRAM 20 MG/1
30 TABLET ORAL DAILY
Qty: 45 TABLET | Refills: 0 | Status: SHIPPED | OUTPATIENT
Start: 2021-06-07 | End: 2021-06-14 | Stop reason: SDUPTHER

## 2021-06-09 ENCOUNTER — TELEPHONE (OUTPATIENT)
Dept: BEHAVIORAL HEALTH | Age: 61
End: 2021-06-09

## 2021-06-10 ENCOUNTER — TELEPHONE (OUTPATIENT)
Dept: BEHAVIORAL HEALTH | Age: 61
End: 2021-06-10

## 2021-06-10 RX ORDER — BUSPIRONE HYDROCHLORIDE 10 MG/1
10 TABLET ORAL 3 TIMES DAILY
Qty: 90 TABLET | Refills: 0 | Status: SHIPPED | OUTPATIENT
Start: 2021-06-10 | End: 2021-06-14 | Stop reason: CLARIF

## 2021-06-13 ENCOUNTER — TELEPHONE (OUTPATIENT)
Dept: SCHEDULING | Age: 61
End: 2021-06-13

## 2021-06-13 NOTE — ED INITIAL ASSESSMENT (HPI)
Pt to ED with panic attack. Pt states that she stopped taking her Clonazepam on Thursday because she felt it was making her sick to her stomach. Pt was seen at [facility] yesterday for the same, given IV Ativan and script for PO.  Pt states that she felt better af

## 2021-06-13 NOTE — PROGRESS NOTES
06/13/21 [address]   Have you been practicing social distancing? Yes   Have you been wearing a mask when in the community? Yes   Are the people you live with following social distancing and wearing a mask?  Yes   While you are

## 2021-06-13 NOTE — ED PROVIDER NOTES
Patient Seen in: [facility] Emergency Department      History   Patient presents with:  Eval-D    Stated Complaint: Detox from benzos, feeling anxious    HPI/Subjective:   HPI    51-year-old female presents emergency department complaining of a jorge Smokeless tobacco: Never Used      Tobacco comment: patient does not want to quit smoking    Vaping Use      Vaping Use: Former        Substances: Nicotine        Devices: Disposable    Alcohol use: Never      Comment: very rare    Drug use:  No pulsatile mass  Extremities: No clubbing cyanosis or edema 2+ distal pulses. Neuro: Cranial nerves II through XII intact with no gross focal sensory or motor abnormality. Psychiatric: No suicidal or homicidal ideations.     ED Course     Labs Reviewed   C Patient was screened and evaluated during this visit. As the treating physician attending to the patient, I determined within reasonable clinical confidence and prior to discharge, that an emergency medical condition was not or was no longer present.   Kishan Vieyra

## 2021-06-14 ENCOUNTER — TELEPHONE (OUTPATIENT)
Dept: SCHEDULING | Age: 61
End: 2021-06-14

## 2021-06-14 ENCOUNTER — BEHAVIORAL HEALTH (OUTPATIENT)
Dept: BEHAVIORAL HEALTH | Age: 61
End: 2021-06-14

## 2021-06-14 ENCOUNTER — TELEPHONE (OUTPATIENT)
Dept: BEHAVIORAL HEALTH | Age: 61
End: 2021-06-14

## 2021-06-14 DIAGNOSIS — F42.9 OBSESSIVE-COMPULSIVE DISORDER, UNSPECIFIED TYPE: ICD-10-CM

## 2021-06-14 DIAGNOSIS — F41.1 GENERALIZED ANXIETY DISORDER: ICD-10-CM

## 2021-06-14 DIAGNOSIS — R11.0 NAUSEA: Primary | ICD-10-CM

## 2021-06-14 DIAGNOSIS — F41.0 PANIC DISORDER (EPISODIC PAROXYSMAL ANXIETY): ICD-10-CM

## 2021-06-14 DIAGNOSIS — F33.1 MODERATE EPISODE OF RECURRENT MAJOR DEPRESSIVE DISORDER (CMD): ICD-10-CM

## 2021-06-14 PROCEDURE — 99213 OFFICE O/P EST LOW 20 MIN: CPT | Performed by: PSYCHIATRY & NEUROLOGY

## 2021-06-14 RX ORDER — CITALOPRAM 40 MG/1
40 TABLET ORAL DAILY
Qty: 30 TABLET | Refills: 3 | Status: SHIPPED | OUTPATIENT
Start: 2021-06-14

## 2021-06-14 RX ORDER — ONDANSETRON 8 MG/1
8 TABLET, ORALLY DISINTEGRATING ORAL 2 TIMES DAILY PRN
Status: DISCONTINUED | OUTPATIENT
Start: 2021-06-14 | End: 2021-06-15 | Stop reason: SDUPTHER

## 2021-06-14 RX ORDER — CHLORPROMAZINE HYDROCHLORIDE 50 MG/1
50 TABLET, FILM COATED ORAL 4 TIMES DAILY
Qty: 120 TABLET | Refills: 3 | Status: SHIPPED | OUTPATIENT
Start: 2021-06-14 | End: 2021-06-14 | Stop reason: SDUPTHER

## 2021-06-14 RX ORDER — MIRTAZAPINE 30 MG/1
30 TABLET, FILM COATED ORAL NIGHTLY
Qty: 30 TABLET | Refills: 3 | Status: SHIPPED | OUTPATIENT
Start: 2021-06-14

## 2021-06-15 ENCOUNTER — HOSPITAL ENCOUNTER (EMERGENCY)
Facility: HOSPITAL | Age: 61
Discharge: NURSING, REHAB OR LONG-TERM CARE | End: 2021-06-16
Attending: EMERGENCY MEDICINE
Payer: COMMERCIAL

## 2021-06-15 DIAGNOSIS — F32.A DEPRESSION, UNSPECIFIED DEPRESSION TYPE: Primary | ICD-10-CM

## 2021-06-15 PROCEDURE — 80053 COMPREHEN METABOLIC PANEL: CPT | Performed by: EMERGENCY MEDICINE

## 2021-06-15 PROCEDURE — 93010 ELECTROCARDIOGRAM REPORT: CPT

## 2021-06-15 PROCEDURE — 85025 COMPLETE CBC W/AUTO DIFF WBC: CPT | Performed by: EMERGENCY MEDICINE

## 2021-06-15 PROCEDURE — 96374 THER/PROPH/DIAG INJ IV PUSH: CPT

## 2021-06-15 PROCEDURE — 99285 EMERGENCY DEPT VISIT HI MDM: CPT

## 2021-06-15 PROCEDURE — 93005 ELECTROCARDIOGRAM TRACING: CPT

## 2021-06-15 PROCEDURE — 80307 DRUG TEST PRSMV CHEM ANLYZR: CPT | Performed by: EMERGENCY MEDICINE

## 2021-06-15 PROCEDURE — 0241U SARS-COV-2/FLU A AND B/RSV BY PCR (GENEXPERT): CPT | Performed by: EMERGENCY MEDICINE

## 2021-06-15 PROCEDURE — 80143 DRUG ASSAY ACETAMINOPHEN: CPT | Performed by: EMERGENCY MEDICINE

## 2021-06-15 PROCEDURE — 96376 TX/PRO/DX INJ SAME DRUG ADON: CPT

## 2021-06-15 PROCEDURE — 82077 ASSAY SPEC XCP UR&BREATH IA: CPT | Performed by: EMERGENCY MEDICINE

## 2021-06-15 PROCEDURE — 80179 DRUG ASSAY SALICYLATE: CPT | Performed by: EMERGENCY MEDICINE

## 2021-06-15 RX ORDER — ONDANSETRON 8 MG/1
8 TABLET, ORALLY DISINTEGRATING ORAL 2 TIMES DAILY PRN
Qty: 30 TABLET | Refills: 0 | Status: SHIPPED | OUTPATIENT
Start: 2021-06-15

## 2021-06-15 RX ORDER — ONDANSETRON 2 MG/ML
4 INJECTION INTRAMUSCULAR; INTRAVENOUS ONCE
Status: COMPLETED | OUTPATIENT
Start: 2021-06-15 | End: 2021-06-15

## 2021-06-15 RX ORDER — ONDANSETRON 8 MG/1
8 TABLET, ORALLY DISINTEGRATING ORAL 2 TIMES DAILY PRN
COMMUNITY
End: 2021-06-15 | Stop reason: SDUPTHER

## 2021-06-15 RX ORDER — ACETAMINOPHEN 500 MG
TABLET ORAL
Status: COMPLETED
Start: 2021-06-15 | End: 2021-06-15

## 2021-06-15 RX ORDER — ACETAMINOPHEN 500 MG
1000 TABLET ORAL ONCE
Status: COMPLETED | OUTPATIENT
Start: 2021-06-15 | End: 2021-06-15

## 2021-06-15 RX ORDER — NICOTINE 21 MG/24HR
1 PATCH, TRANSDERMAL 24 HOURS TRANSDERMAL DAILY
Status: DISCONTINUED | OUTPATIENT
Start: 2021-06-15 | End: 2021-06-16

## 2021-06-15 RX ORDER — IBUPROFEN 600 MG/1
600 TABLET ORAL ONCE
Status: COMPLETED | OUTPATIENT
Start: 2021-06-15 | End: 2021-06-15

## 2021-06-15 RX ORDER — CARBAMAZEPINE 100 MG/1
TABLET, CHEWABLE ORAL
Status: ON HOLD | COMMUNITY
Start: 2021-02-09 | End: 2021-06-23

## 2021-06-15 RX ORDER — CHLORPROMAZINE HYDROCHLORIDE 50 MG/1
50 TABLET, FILM COATED ORAL 3 TIMES DAILY
Status: ON HOLD | COMMUNITY
End: 2021-06-23

## 2021-06-15 NOTE — ED INITIAL ASSESSMENT (HPI)
Pt reprots feeling suicidal, passed out in waiting room, states she started taking thorazine and has felt very lightheaded with low blood pressures, pt is tearful and states she doesn't want to wake up anymore

## 2021-06-16 VITALS
WEIGHT: 140 LBS | RESPIRATION RATE: 16 BRPM | DIASTOLIC BLOOD PRESSURE: 63 MMHG | BODY MASS INDEX: 21.97 KG/M2 | TEMPERATURE: 99 F | HEIGHT: 67 IN | SYSTOLIC BLOOD PRESSURE: 105 MMHG | OXYGEN SATURATION: 95 % | HEART RATE: 72 BPM

## 2021-06-16 PROBLEM — F33.2 SEVERE RECURRENT MAJOR DEPRESSION WITHOUT PSYCHOTIC FEATURES (HCC): Status: ACTIVE | Noted: 2021-06-16

## 2021-06-16 PROBLEM — F33.2 MDD (MAJOR DEPRESSIVE DISORDER), RECURRENT EPISODE, SEVERE (HCC): Status: ACTIVE | Noted: 2021-06-16

## 2021-06-16 PROCEDURE — 81003 URINALYSIS AUTO W/O SCOPE: CPT | Performed by: EMERGENCY MEDICINE

## 2021-06-16 RX ORDER — CITALOPRAM 40 MG/1
30 TABLET ORAL DAILY
Status: ON HOLD | COMMUNITY
End: 2021-06-23

## 2021-06-16 RX ORDER — LORAZEPAM 1 MG/1
1 TABLET ORAL ONCE
Status: DISCONTINUED | OUTPATIENT
Start: 2021-06-16 | End: 2021-06-16

## 2021-06-16 RX ORDER — ALPRAZOLAM 0.5 MG/1
0.5 TABLET ORAL ONCE
Status: COMPLETED | OUTPATIENT
Start: 2021-06-16 | End: 2021-06-16

## 2021-06-16 RX ORDER — OXYBUTYNIN CHLORIDE 10 MG/1
10 TABLET, EXTENDED RELEASE ORAL DAILY
Status: DISCONTINUED | OUTPATIENT
Start: 2021-06-16 | End: 2021-06-16

## 2021-06-16 RX ORDER — IBUPROFEN 600 MG/1
600 TABLET ORAL ONCE
Status: COMPLETED | OUTPATIENT
Start: 2021-06-16 | End: 2021-06-16

## 2021-06-16 RX ORDER — MIRTAZAPINE 15 MG/1
30 TABLET, FILM COATED ORAL NIGHTLY
Status: DISCONTINUED | OUTPATIENT
Start: 2021-06-16 | End: 2021-06-16

## 2021-06-16 RX ORDER — ACETAMINOPHEN 500 MG
1000 TABLET ORAL ONCE
Status: DISCONTINUED | OUTPATIENT
Start: 2021-06-16 | End: 2021-06-16

## 2021-06-16 RX ORDER — ONDANSETRON 2 MG/ML
4 INJECTION INTRAMUSCULAR; INTRAVENOUS ONCE
Status: COMPLETED | OUTPATIENT
Start: 2021-06-16 | End: 2021-06-16

## 2021-06-16 NOTE — ED QUICK NOTES
Patient belongings placed in smart safe bag [id number] and #[id number] and given to public safety to be secured

## 2021-06-16 NOTE — ED QUICK NOTES
Pt requesting medication for her anxiety. Pt offered po ativan and refused saying she does not take benzos and that she can only take valium or xanax.  MD aware and no new orders at this time

## 2021-06-16 NOTE — BH LEVEL OF CARE REASSESSMENT
Level of Care Reassessment Note    The prior assessment completed on 6/13/21 has been reviewed. The level of care recommended was declined/postponed due to: wants higher level of care         Patient presents today for reassessment due to SI.     Referral medication. She states the medication is causing the Si. Is your experience of thoughts of dying by suicide: Frightening  Protective Factors: \"God. I'm a [place] and it's just not me. \"  Past Suicidal Ideation: Attempt  Describe: Hernandez Rain states in Nov 2 Affect  Mood or Feelings: Calm  Appropriateness of Affect: Congruent to mood  Attitude toward staff: Co-operative  Speech  Rate of Speech: Appropriate  Intensity of Volume: Ordinary  Cognition  Insight: Poor  Judgment: Poor  Thought Patterns  Clarity/Relev Assessment             Arabella Roberson YOB: 1960   Age 61year old MRN [id number]   Location [address] [facility] Attending Neo Womack MD      Patient's legal sex: female  Patient identifies as: female  Patient's birth sex No  5a. Have you started to work out or worked out the details of how to kill yourself? (past 30 days): No  5b. Do you intend to carry out this plan? (past 30 days): No  6.  Have you ever done anything, started to do anything, or prepared to do anything to                Substance Use:  Pt denies substance use outside of her prescribed medication.                     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   Patient declined to answer etc.): Fully Independent  Toileting  Patient Incontinence: None  Special Considerations  Patient has pressures sores, surgical wounds, bandages/dressings?: No  Patient uses any kind of pump?: No  Does the patient need a BiPAP or CPAP?: No  Intellectual dis medication compliant and going through withdrawal  Judgment: Poor  Fair/poor judgment as evidenced by: pt shared that she decided to stop her medication d/t sedating effect but is requestion for medication  Thought Patterns  Clarity/Relevance: Illogical;Ir Diagnoses     Pertinent Non-Psychiatric Diagnoses              [facility]

## 2021-06-16 NOTE — ED PROVIDER NOTES
Patient Seen in: [facility] Emergency Department      History   Patient presents with:  Eval-P    Stated Complaint: Suicidal ideations ~ has plan;     HPI/Subjective:   HPI    26-year-old female with a history of depression, history COPD, lupus, anx perforation. Neck exam: Supple. There is no lymphadenopathy or carotid bruit. Cardiovascular exam: Regular rate and rhythm. There are no murmurs, rubs, gallops. Lungs: Clear to auscultation bilaterally. There is no audible wheezes, Rales, rhonchi.   A orders were created for panel order CBC With Differential With Platelet.   Procedure                               Abnormality         Status                     ---------                               -----------         ------                     CBC W/ D on file for this visit.     Follow-up:  [facility]  [address]  [address] [address]  [phone]  Call  As needed, If symptoms worsen          Medications Prescribed:  Current Discharge Medication List

## 2021-06-16 NOTE — ED QUICK NOTES
Report received from [facility]. Patient is resting on cot and is asking for medication for nausea. Will speak with MD about this and order.

## 2021-06-16 NOTE — PROGRESS NOTES
06/15/21 [address] [facility]   Have you been practicing social distancing? Yes   Have you been wearing a mask when in the community?  Yes   Are the people you live with following social distancing and wearing a mask?   (lives alone)   [facility]

## 2021-06-16 NOTE — ED QUICK NOTES
Patient asked for medication for headache. Obtained tylenol, but patient refused and is asking for ibuprofen.

## 2021-06-21 ENCOUNTER — TELEPHONE (OUTPATIENT)
Dept: BEHAVIORAL HEALTH | Age: 61
End: 2021-06-21

## 2021-06-22 ENCOUNTER — APPOINTMENT (OUTPATIENT)
Dept: BEHAVIORAL HEALTH | Age: 61
End: 2021-06-22

## 2021-06-25 ENCOUNTER — HOSPITAL ENCOUNTER (EMERGENCY)
Facility: HOSPITAL | Age: 61
Discharge: NURSING, REHAB OR LONG-TERM CARE | End: 2021-06-25
Attending: STUDENT IN AN ORGANIZED HEALTH CARE EDUCATION/TRAINING PROGRAM
Payer: COMMERCIAL

## 2021-06-25 ENCOUNTER — APPOINTMENT (OUTPATIENT)
Dept: GENERAL RADIOLOGY | Facility: HOSPITAL | Age: 61
End: 2021-06-25
Payer: COMMERCIAL

## 2021-06-25 VITALS
OXYGEN SATURATION: 96 % | WEIGHT: 140 LBS | HEART RATE: 67 BPM | DIASTOLIC BLOOD PRESSURE: 48 MMHG | TEMPERATURE: 98 F | BODY MASS INDEX: 22 KG/M2 | SYSTOLIC BLOOD PRESSURE: 101 MMHG | RESPIRATION RATE: 18 BRPM

## 2021-06-25 DIAGNOSIS — F41.0 PANIC ATTACK: ICD-10-CM

## 2021-06-25 DIAGNOSIS — R07.9 ACUTE CHEST PAIN: Primary | ICD-10-CM

## 2021-06-25 DIAGNOSIS — F41.9 ANXIETY: ICD-10-CM

## 2021-06-25 DIAGNOSIS — F32.A DEPRESSION, UNSPECIFIED DEPRESSION TYPE: ICD-10-CM

## 2021-06-25 PROBLEM — F41.1 GENERALIZED ANXIETY DISORDER: Status: ACTIVE | Noted: 2021-06-25

## 2021-06-25 PROCEDURE — 93010 ELECTROCARDIOGRAM REPORT: CPT

## 2021-06-25 PROCEDURE — 82077 ASSAY SPEC XCP UR&BREATH IA: CPT | Performed by: STUDENT IN AN ORGANIZED HEALTH CARE EDUCATION/TRAINING PROGRAM

## 2021-06-25 PROCEDURE — 85379 FIBRIN DEGRADATION QUANT: CPT | Performed by: STUDENT IN AN ORGANIZED HEALTH CARE EDUCATION/TRAINING PROGRAM

## 2021-06-25 PROCEDURE — 96375 TX/PRO/DX INJ NEW DRUG ADDON: CPT

## 2021-06-25 PROCEDURE — 96365 THER/PROPH/DIAG IV INF INIT: CPT

## 2021-06-25 PROCEDURE — 93005 ELECTROCARDIOGRAM TRACING: CPT

## 2021-06-25 PROCEDURE — 99285 EMERGENCY DEPT VISIT HI MDM: CPT

## 2021-06-25 PROCEDURE — 81003 URINALYSIS AUTO W/O SCOPE: CPT | Performed by: STUDENT IN AN ORGANIZED HEALTH CARE EDUCATION/TRAINING PROGRAM

## 2021-06-25 PROCEDURE — 0241U SARS-COV-2/FLU A AND B/RSV BY PCR (GENEXPERT): CPT | Performed by: STUDENT IN AN ORGANIZED HEALTH CARE EDUCATION/TRAINING PROGRAM

## 2021-06-25 PROCEDURE — 80053 COMPREHEN METABOLIC PANEL: CPT | Performed by: STUDENT IN AN ORGANIZED HEALTH CARE EDUCATION/TRAINING PROGRAM

## 2021-06-25 PROCEDURE — 85025 COMPLETE CBC W/AUTO DIFF WBC: CPT | Performed by: STUDENT IN AN ORGANIZED HEALTH CARE EDUCATION/TRAINING PROGRAM

## 2021-06-25 PROCEDURE — 80307 DRUG TEST PRSMV CHEM ANLYZR: CPT | Performed by: STUDENT IN AN ORGANIZED HEALTH CARE EDUCATION/TRAINING PROGRAM

## 2021-06-25 PROCEDURE — 84484 ASSAY OF TROPONIN QUANT: CPT | Performed by: STUDENT IN AN ORGANIZED HEALTH CARE EDUCATION/TRAINING PROGRAM

## 2021-06-25 PROCEDURE — 71045 X-RAY EXAM CHEST 1 VIEW: CPT | Performed by: STUDENT IN AN ORGANIZED HEALTH CARE EDUCATION/TRAINING PROGRAM

## 2021-06-25 RX ORDER — MAGNESIUM SULFATE 1 G/100ML
1 INJECTION INTRAVENOUS ONCE
Status: COMPLETED | OUTPATIENT
Start: 2021-06-25 | End: 2021-06-25

## 2021-06-25 RX ORDER — ONDANSETRON 2 MG/ML
INJECTION INTRAMUSCULAR; INTRAVENOUS
Status: COMPLETED
Start: 2021-06-25 | End: 2021-06-25

## 2021-06-25 RX ORDER — IBUPROFEN 600 MG/1
600 TABLET ORAL ONCE
Status: COMPLETED | OUTPATIENT
Start: 2021-06-25 | End: 2021-06-25

## 2021-06-25 RX ORDER — ONDANSETRON 2 MG/ML
4 INJECTION INTRAMUSCULAR; INTRAVENOUS ONCE
Status: COMPLETED | OUTPATIENT
Start: 2021-06-25 | End: 2021-06-25

## 2021-06-25 RX ORDER — ONDANSETRON 4 MG/1
4 TABLET, ORALLY DISINTEGRATING ORAL ONCE
Status: COMPLETED | OUTPATIENT
Start: 2021-06-25 | End: 2021-06-25

## 2021-06-25 RX ORDER — IBUPROFEN 600 MG/1
TABLET ORAL
Status: COMPLETED
Start: 2021-06-25 | End: 2021-06-25

## 2021-06-25 RX ORDER — NICOTINE 21 MG/24HR
1 PATCH, TRANSDERMAL 24 HOURS TRANSDERMAL DAILY
Status: DISCONTINUED | OUTPATIENT
Start: 2021-06-25 | End: 2021-06-25

## 2021-06-25 RX ORDER — ACETAMINOPHEN 500 MG
1000 TABLET ORAL ONCE
Status: DISCONTINUED | OUTPATIENT
Start: 2021-06-25 | End: 2021-06-25

## 2021-06-25 NOTE — ED QUICK NOTES
Attempted to call report to [facility]. RN states will call intake at [facility] and call ER back.

## 2021-06-25 NOTE — PROGRESS NOTES
06/25/21 [address]   Have you been practicing social distancing? Yes   Have you been wearing a mask when in the community? Yes   Are the people you live with following social distancing and wearing a mask?  Yes   While you are

## 2021-06-25 NOTE — ED INITIAL ASSESSMENT (HPI)
Pt was discharged from [facility] x 2 days ago for SI and anxiety. Pt called friend this am very agitated. Friend states when she arrived, pt very agitated and could not focus. Pt made statements that she wanted to kill herself.  Pt arrives stating \"my chest h

## 2021-06-25 NOTE — ED PROVIDER NOTES
Patient Seen in: [facility] Emergency Department      History   Patient presents with:  Chest Pain Angina  Anxiety/Panic attack  Eval-P    Stated Complaint: chest pain , panic attack    HPI/Subjective:   HPI    Patient is a 70-year-old male who pres Packs/day: 1.00        Years: 50.00        Pack years: 48        Types: Cigarettes        Quit date: 3/22/2021        Years since quittin.2      Smokeless tobacco: Never Used      Tobacco comment: patient does not want to quit smoking    Vaping Use affect.      ED Course     Labs Reviewed   COMP METABOLIC PANEL (14) - Abnormal; Notable for the following components:       Result Value    Calcium, Total 10.3 (*)     Total Protein 8.7 (*)     Globulin  4.5 (*)     A/G Ratio 0.9 (*)     All other componen CULTURE REFLEX   RAINBOW DRAW LAVENDER   RAINBOW DRAW LIGHT GREEN   RAINBOW DRAW GOLD   CBC W/ DIFFERENTIAL     EKG    Rate, intervals and axes as noted on EKG Report.   Rate: 88  Rhythm: Sinus Rhythm  Reading: Sinus rhythm with short DE, normal axis, no ST disposition time on file for this visit. Follow-up:  No follow-up provider specified.         Medications Prescribed:  Current Discharge Medication List

## 2021-07-16 ENCOUNTER — APPOINTMENT (OUTPATIENT)
Dept: GENERAL RADIOLOGY | Facility: HOSPITAL | Age: 61
End: 2021-07-16
Attending: EMERGENCY MEDICINE
Payer: COMMERCIAL

## 2021-07-16 PROCEDURE — 71045 X-RAY EXAM CHEST 1 VIEW: CPT | Performed by: EMERGENCY MEDICINE

## 2021-07-16 NOTE — ED INITIAL ASSESSMENT (HPI)
To the ED with c/o of chest pain, racing heart, feeling sick to her stomach, cold. Pt reports taking Xanax this AM and gave her suicidal thoughts and smoked hemp to make herself feel better. Pt very anxious- speaking in circles.  Pt reports discharged from

## 2021-07-16 NOTE — ED QUICK NOTES
Pt extremely anxious- moving back and forth on bed- unable to follow directions- continues to repeat the same thing over and over.

## 2021-07-16 NOTE — ED NOTES
TRANSFER SUMMARY:    CB:  @ 5:59 pm-Contacted intake/referral line speaking with [place]. Faxed packet pending COVID.
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
## 2021-07-16 NOTE — ED PROVIDER NOTES
Patient Seen in: [facility] Emergency Department      History   Patient presents with:  Arrythmia/Palpitations    Stated Complaint: palpitations    HPI/Subjective:   HPI    This patient presents with a complaint of shortness of breath 61year-old fe 07/16/21 1208 (!) 148/93   Pulse 07/16/21 1208 93   Resp 07/16/21 1208 24   Temp 07/16/21 1919 97.8 °F (36.6 °C)   Temp src 07/16/21 1919 Oral   SpO2 07/16/21 1208 95 %   O2 Device 07/16/21 1919 None (Room air)       Current:/47   Pulse 66   Temp 97. Normal   ETHYL ALCOHOL - Normal   D-DIMER - Normal   SARS-COV-2/FLU A AND B/RSV BY PCR (GENEXPERT) - Normal    Narrative:      This test is intended for the qualitative detection and differentiation of SARS-CoV-2, influenza A, influenza B, and respiratory s second dose of 5 mg diazepam.                             Disposition and Plan     Clinical Impression:  Generalized anxiety disorder  (primary encounter diagnosis)  Severe recurrent major depression without psychotic features ([facility] [address]     Disposition:  Psych

## 2021-07-17 NOTE — ED PROVIDER NOTES
Patient was endorsed to me she had a negative D-dimer. She was evaluated by [facility]. She was becoming increasingly agitated and we did give her dose of Zyprexa.   CRISTOBAL had recently seen her and they spoke with the psychiatrist that taking care of her before I

## 2021-07-17 NOTE — ED NOTES
Faxed site specific covid screening and EKG results to [employer]. Pt signed voluntary admission and patient rights. Uploaded to file.

## 2021-07-17 NOTE — ED NOTES
Patient remains cooperative at this time. Patient with no other new complaints at this time. Awaiting final disposition by Brutus Mcardle at this time. Patient's case endorsed to my colleague, Dr. Jefferson Mortensen at the end of my shift.

## 2021-08-01 ENCOUNTER — APPOINTMENT (OUTPATIENT)
Dept: GENERAL RADIOLOGY | Facility: HOSPITAL | Age: 61
End: 2021-08-01
Attending: EMERGENCY MEDICINE
Payer: COMMERCIAL

## 2021-08-01 PROBLEM — F32.A ANXIETY AND DEPRESSION: Status: ACTIVE | Noted: 2020-10-28

## 2021-08-01 PROBLEM — F41.9 ANXIETY AND DEPRESSION: Status: ACTIVE | Noted: 2020-10-28

## 2021-08-01 PROCEDURE — 71045 X-RAY EXAM CHEST 1 VIEW: CPT | Performed by: EMERGENCY MEDICINE

## 2021-08-01 NOTE — ED INITIAL ASSESSMENT (HPI)
Pt presents to C3 via cart with complaints of panic attack. Pt states she has a hx of panic attacks and took 2mg ativan at 7am. Pt states she was not feeling better so her son drove her to the ER.     Pt lying on cart with eyes closed, arousable to verbal s

## 2021-08-01 NOTE — ED QUICK NOTES
Discussed with Dr. Jace Major pt with suicidal thoughts and no plan. Pt is medium risk. Per Dr. Jace Major, pt does not need to be in seclusion at this time. To monitor closely.

## 2021-08-01 NOTE — ED NOTES
Updated pt on plan of care for inpatient admission. Pt continues to be tearful and hyperventilating. Pt made aware there are no beds at [facility] and will need transfer out. Pt is requesting to not be transferred to [facility] and [address] [place] [address].

## 2021-08-01 NOTE — BH LEVEL OF CARE ASSESSMENT
Crisis Evaluation Assessment    Mamadou Mcnair YOB: 1960   Age 61year old MRN [id number]   Location [address] [facility] [address] Attending Charlie Watkins MD      Patient's legal sex: female  Patient identifies as: female  Carmina yourself? (past 30 days): Yes  3. Have you been thinking about how you might kill yourself? (past 30 days): No  4. Have you had these thoughts and had some intention of acting on them? (past 30 days): No  5a.  Have you started to work out or worked out the reports she has been showering daily. Pt reports she is \"masively\" depressed in which she is crying \"all the time\" and is unable to stop crying. Pt then asks \"do you have any beds here.   I was in [place] last time and I don't want to be around artie CRISTOBAL (multiple times), [facility], and alexian brothers. Pt reports she has been to [facility] from 4/8/2021 to 5/6/2021. Per previous assessment, pt has completed several IOP and PHP programs as well.   Pt has a current psychiatrist Dr. Paloma Bolton No  Intellectual disability reported?   Intellectual Disability?: No  Reported Social/Emotional Functioning Level  Describe: pt able to complete the assessment      EDP Assessment (as applicable):  IBW Calculations  Weight: 140 lb  BMI (Calculated): 21.3  I Ordinary  Level of Consciousness: Alert  Level of Consciousness: Alert  Behavior  Exhibited behavior: Compulsive      Disposition:  Dr. Indra Brooks recommended inpt    Assessment Summary:   Pt is a 61year old female presenting to [facility] ED via her son seeking hailey (n/a)  Outpatient Recommendations:  (n/a)  Behavioral Precautions: Close Observation  Medical Precautions: None  Refused Treatment: No  Education Provided: Call 911 in an Emergency;[facility] Crisis Line Number;Advised to call if condition worsens; Advised to call

## 2021-08-01 NOTE — ED QUICK NOTES
RN at bedside continuing to instruct pt on slowing her breathing down. Pt c/o tingling to extremities. RR now down to 28.

## 2021-08-01 NOTE — ED PROVIDER NOTES
Patient Seen in: [facility] Emergency Department      History   Patient presents with:   Anxiety/Panic attack    Stated Complaint: SOB and Panic attack for last 2 hrs    HPI/Subjective:   HPI    Patient is a 80-year-old female smoker, with history of systems reviewed and negative except as noted above.     Physical Exam     ED Triage Vitals [08/01/21 0937]   /63   Pulse 86   Resp 20   Temp 97.8 °F (36.6 °C)   Temp src Oral   SpO2 97 %   O2 Device None (Room air)       Current:/60   Pulse 77 were created for panel order CBC With Differential With Platelet.   Procedure                               Abnormality         Status                     ---------                               -----------         ------                     CBC W/ DIFFERDINESH noted above. Patient was observed while the laboratory and radiology studies were obtained. Results of the patient's laboratory and radiology studies were reviewed and consistent with hyperventilation and acute anxiety.   Patient is extremely emotionally

## 2021-08-01 NOTE — CONSULTS
[facility]  Psychiatric Evaluation    Dontae Parkinson YOB: 1960   Age/Gender 61year old female MRN [id number]   Location 656 [facility] PCP Mary Hall DO     Date of Service:  8/1/2021     Allergies:  Atar going to give her ketamine treatment, but she was unclear logistically how this would work or how much it would cost.    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   Memory intact for immediate, recent and remote by conduct of interview, and concentration poor and Will increase Ativan 1 mg 4 times a day. Encouraged her to take the medicine on a regular basis so she does not go through withdrawals between doses. Also restart Remeron 15 mg at night, propranolol 10 mg 3 times a day in lieu of metoprolol.   Being a non

## 2021-08-01 NOTE — ED QUICK NOTES
Per CRISTOBAL, pt is now being admitted for inability to care for self, inability to function due to level of anxiety.

## 2021-08-01 NOTE — ED QUICK NOTES
Pt requesting ativan 1mg oral that pt states she normally takes at noon. Discussed with Dr. Zaynab Rose and ok to administer.

## 2021-08-01 NOTE — ED NOTES
Transfer Summary 8/1/21      Faxed:  Jamal    Deflected:  [facility] - no beds  [facility] - no beds  [facility] - no beds  [facility] - no beds

## 2021-08-01 NOTE — ED QUICK NOTES
Pt calm and cooperative at this time. Pt states she is feeling better. CRISTOBAL at bedside, telepsych visit.

## 2021-08-01 NOTE — ED NOTES
Transfer Summary 8/1/21      Faxed:     In review:  Karthik Abdalla 1874    Deflected:  [facility] - no beds  [facility] - no beds  [facility] - no beds  [facility] - no beds  [facility] - no beds

## 2021-08-01 NOTE — ED NOTES
Transfer Summary 8/1/21      Faxed:     In review:  Karthik Abdalla 1874    Deflected:  [facility] - no beds  [facility] - no beds  [facility] - no beds  [facility] - no beds  [facility] - no beds  [facility] - no beds   [facility] - no beds

## 2021-08-01 NOTE — CERTIFICATION
Ref: [facility] 5/3-403, 5/3-602, 5/3-607, 5/3-610    5/3-702, 5/3-813, 5/4-306, 5/4-402, 5/4-403    5/4-405, 5/4-501, 5/4-611, 0/9-140   Inpatient Certificate  Re: Radha Davidson    (name)     I personally informed the above-named individual of the pur treated on an inpatient basis, is reasonably expected based on his or her behavioral history, to suffer mental or emotional deterioration and is reasonably expected, after such deterioration, to meet the criteria of either paragraph one or paragraph two ab

## 2021-08-01 NOTE — ED QUICK NOTES
Pt states, \"Ativa won't take the panic. My doctor wants me to take Ketamine but I cant get there. \"    Pt awake answering questions appropriately, skin w/d,resps reg/now appears shallow.

## 2021-08-01 NOTE — PROGRESS NOTES
[facility]   Have you been practicing social distancing? Yes   Have you been wearing a mask when in the community? Yes   Are the people you live with following social distancing and wearing a mask?  Yes   While you are

## 2021-08-02 NOTE — ED QUICK NOTES
Patient is banging and kicking the door to her room  She started to sit in the corner and rock back and forth  Patient was not able to calm down or follow directions  [facility] crisis worker came to the bedside to talk to the patient  RN medicated the patient.

## 2021-08-02 NOTE — ED QUICK NOTES
Patient ambulated to the wheelchair with steady gait and assisted to the bathroom. Pt remains calm and cooperative.

## 2021-08-02 NOTE — ED NOTES
Transfer Summary 8/02/21    IN REVIEW:   [address] Alfred[address] [facility]: Faxed packet for review. [facility]:  Faxed packet for review. [facility]: Reviewing packet. NO ANSWER:  [facility]: Left VM.     NO BEDS:  [facility]:  Unit full  [facility]

## 2021-08-02 NOTE — ED QUICK NOTES
RN spoke with pharmacy regarding pt Mirabegron mediation, pharmacy does not carry it, RN spoke with pt, she states she has it in her purse. RN called security to obtain medication out of belongings, pt ok with that.

## 2021-08-02 NOTE — ED QUICK NOTES
Patient ambulated around pod c nurses station  She was upset that she was still here  Patient tried to sit on the floor  She was directed back to the room

## 2021-08-02 NOTE — ED PROVIDER NOTES
22-year-old female who presented about 24 hours ago with anxiety. Unable to care for self at home so waiting placement in a psychiatric facility due to her overwhelming anxiety. Was calm overnight but this morning became acutely anxious and agitated.   Wa

## 2021-08-02 NOTE — ED QUICK NOTES
RN went in to speak with pt, pt crying stating that she wants to sign out, pt reeducated that she can not do that. Pt stating she was claustrophobic and cant be in there anymore. Pt walked around ER with RN.

## 2021-08-02 NOTE — ED QUICK NOTES
Patient was banging her head consistently on the door  She would not stop when asked to  Door was open and she was assisted back to the stretcher  Patient kept on saying \"help me\"  RN is at the bedside

## 2021-08-02 NOTE — ED QUICK NOTES
Pt assisted to the bathroom by ITZEL Miller. Pt crying, screaming, put self on the floor stating she does not want to go back to her room, crying, \"I'm claustrauphobic \" Pt assisted back to room, public safety at bedside.  Dr Vince Varela aware, new orders receive

## 2021-08-02 NOTE — ED PROVIDER NOTES
The patient required Haldol and Ativan for an outburst of agitation. She still needs psychiatric placement. Patient was accepted to [facility] and she will be transferred.

## 2021-08-02 NOTE — ED NOTES
Called [facility] and was told bed request was unable to be reviewed last night d/t staffing issues.  Will be reviewed this morning

## 2021-08-02 NOTE — ED QUICK NOTES
Patient was provided with a bedside commode due to her lying on the floor in the bathroom and refusing to get up

## 2021-08-02 NOTE — ED QUICK NOTES
RN got called to pt bedside by PCT. Pt rocking back and forth on the floor, stating she \"cant take it anymore\" that she \"came in here asking for help and this is not helping\".      security at bedside, stating \"this is inhumane no one cares\" RN asked

## 2021-08-02 NOTE — ED QUICK NOTES
Pt on call light. Writer answered call light pt stating she cant do it anymore. Pt began hitting head against door window. Writer directed pt to sit on the bed so staff can enter the room. Pt continued to yell and toss herself into walls and door.  Writer o

## 2021-08-03 NOTE — ED NOTES
Pt deflected from [address] [place] [address]    Faxed updated P&C to [facility] for review, placed originals on chart

## 2021-08-03 NOTE — ED NOTES
Re-faxed COVID, P&C to [facility] stated they will give accepting information when pt info is received

## 2021-08-03 NOTE — ED NOTES
[facility]   Transport to be set for 10:30pm   Dr. Castro Friend 2 [facility]   Bed: 5042 Bed 2

## 2021-08-03 NOTE — ED QUICK NOTES
Patient ambulated to restroom with RN/PCT. Updated on plan of care. Asking for turkey sandwich. Denies any other needs.

## 2021-08-09 NOTE — BH LEVEL OF CARE ASSESSMENT
Crisis Evaluation Assessment    Shira Almaraz YOB: 1960   Age 61year old MRN [id number]   Location 6[address] [facility] Attending Inocencia Woo MD      Patient's legal sex: female  Patient identifies as: female  Patient's Right thyroid lobe:  6.5 x 2.7 x 2.4 cm     Left thyroid lobe:  6.3 x 2.3 x 2.1 cm     Isthmus:  0.4 cm     Thyroid Gland:  Thyroid gland demonstrates normal echotexture and vascularity.     Nodules: 2 thyroid nodules are present as detailed below     1. Right mid thyroid lobe 3.1 x 1.9 x 1.4 cm predominantly solid nodule with  a few small internal cystic foci, with internal vascularity, hypoechoic solid  components, lobular margins, and wider than tall orientation with no punctate  echogenic foci (Category TR 4). 2. Left mid thyroid lobe 1.3 x 1.3 x 0.9 cm spongiform nodule (Category TR 1).     Order FNA of right 3.1cm nodule started to work out or worked out the details of how to kill yourself? (past 30 days): No  5b. Do you intend to carry out this plan? (past 30 days): No  6.  Have you ever done anything, started to do anything, or prepared to do anything to end your life? (l substance use outside of her prescribed medication. 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 Patient declined to answer additional questions.             Current Treatmen None  Special Considerations  Patient has pressures sores, surgical wounds, bandages/dressings?: No  Patient uses any kind of pump?: No  Does the patient need a BiPAP or CPAP?: No  Intellectual disability reported?   Intellectual Disability?: No         EDP through withdrawal  Judgment: Poor  Fair/poor judgment as evidenced by: pt shared that she decided to stop her medication d/t sedating effect but is requestion for medication  Thought Patterns  Clarity/Relevance: Illogical;Irrelevant to topic; Coherent  [employer]

## 2021-08-19 ENCOUNTER — HOSPITAL ENCOUNTER (EMERGENCY)
Facility: HOSPITAL | Age: 61
Discharge: NURSING, REHAB OR LONG-TERM CARE | End: 2021-08-19
Attending: EMERGENCY MEDICINE
Payer: COMMERCIAL

## 2021-08-19 ENCOUNTER — APPOINTMENT (OUTPATIENT)
Dept: GENERAL RADIOLOGY | Facility: HOSPITAL | Age: 61
End: 2021-08-19
Payer: COMMERCIAL

## 2021-08-19 VITALS
DIASTOLIC BLOOD PRESSURE: 58 MMHG | SYSTOLIC BLOOD PRESSURE: 121 MMHG | WEIGHT: 140 LBS | OXYGEN SATURATION: 94 % | BODY MASS INDEX: 21.97 KG/M2 | RESPIRATION RATE: 16 BRPM | TEMPERATURE: 98 F | HEIGHT: 67 IN | HEART RATE: 77 BPM

## 2021-08-19 DIAGNOSIS — F32.A DEPRESSION, UNSPECIFIED DEPRESSION TYPE: Primary | ICD-10-CM

## 2021-08-19 DIAGNOSIS — R45.851 SUICIDAL IDEATION: ICD-10-CM

## 2021-08-19 PROBLEM — E87.1 HYPONATREMIA: Status: ACTIVE | Noted: 2021-08-19

## 2021-08-19 PROBLEM — E87.5 HYPERKALEMIA: Status: ACTIVE | Noted: 2021-08-19

## 2021-08-19 LAB
ALBUMIN SERPL-MCNC: 3.4 G/DL (ref 3.4–5)
ALBUMIN/GLOB SERPL: 0.7 {RATIO} (ref 1–2)
ALP LIVER SERPL-CCNC: 59 U/L
ALT SERPL-CCNC: 32 U/L
AMPHET UR QL SCN: NEGATIVE
ANION GAP SERPL CALC-SCNC: 2 MMOL/L (ref 0–18)
APAP SERPL-MCNC: <2 UG/ML (ref 10–30)
AST SERPL-CCNC: 32 U/L (ref 15–37)
ATRIAL RATE: 82 BPM
BASOPHILS # BLD AUTO: 0.03 X10(3) UL (ref 0–0.2)
BASOPHILS NFR BLD AUTO: 0.4 %
BENZODIAZ UR QL SCN: NEGATIVE
BILIRUB SERPL-MCNC: 0.6 MG/DL (ref 0.1–2)
BILIRUB UR QL STRIP.AUTO: NEGATIVE
BUN BLD-MCNC: 10 MG/DL (ref 7–18)
CALCIUM BLD-MCNC: 9.3 MG/DL (ref 8.5–10.1)
CANNABINOIDS UR QL SCN: NEGATIVE
CHLORIDE SERPL-SCNC: 105 MMOL/L (ref 98–112)
CLARITY UR REFRACT.AUTO: CLEAR
CO2 SERPL-SCNC: 26 MMOL/L (ref 21–32)
COCAINE UR QL: NEGATIVE
CREAT BLD-MCNC: 0.93 MG/DL
CREAT UR-SCNC: 56.1 MG/DL
EOSINOPHIL # BLD AUTO: 0.15 X10(3) UL (ref 0–0.7)
EOSINOPHIL NFR BLD AUTO: 2 %
ERYTHROCYTE [DISTWIDTH] IN BLOOD BY AUTOMATED COUNT: 12.4 %
ETHANOL SERPL-MCNC: <3 MG/DL (ref ?–3)
FLUAV + FLUBV RNA SPEC NAA+PROBE: NEGATIVE
FLUAV + FLUBV RNA SPEC NAA+PROBE: NEGATIVE
GLOBULIN PLAS-MCNC: 4.6 G/DL (ref 2.8–4.4)
GLUCOSE BLD-MCNC: 96 MG/DL (ref 70–99)
GLUCOSE UR STRIP.AUTO-MCNC: NEGATIVE MG/DL
HCT VFR BLD AUTO: 40.9 %
HGB BLD-MCNC: 14 G/DL
IMM GRANULOCYTES # BLD AUTO: 0.04 X10(3) UL (ref 0–1)
IMM GRANULOCYTES NFR BLD: 0.5 %
KETONES UR STRIP.AUTO-MCNC: NEGATIVE MG/DL
LEUKOCYTE ESTERASE UR QL STRIP.AUTO: NEGATIVE
LYMPHOCYTES # BLD AUTO: 0.82 X10(3) UL (ref 1–4)
LYMPHOCYTES NFR BLD AUTO: 10.7 %
M PROTEIN MFR SERPL ELPH: 8 G/DL (ref 6.4–8.2)
MCH RBC QN AUTO: 31.5 PG (ref 26–34)
MCHC RBC AUTO-ENTMCNC: 34.2 G/DL (ref 31–37)
MCV RBC AUTO: 92.1 FL
MDMA UR QL SCN: NEGATIVE
MONOCYTES # BLD AUTO: 0.53 X10(3) UL (ref 0.1–1)
MONOCYTES NFR BLD AUTO: 6.9 %
NEUTROPHILS # BLD AUTO: 6.08 X10 (3) UL (ref 1.5–7.7)
NEUTROPHILS # BLD AUTO: 6.08 X10(3) UL (ref 1.5–7.7)
NEUTROPHILS NFR BLD AUTO: 79.5 %
NITRITE UR QL STRIP.AUTO: NEGATIVE
OPIATES UR QL SCN: NEGATIVE
OSMOLALITY SERPL CALC.SUM OF ELEC: 275 MOSM/KG (ref 275–295)
OXYCODONE UR QL SCN: NEGATIVE
P AXIS: 70 DEGREES
P-R INTERVAL: 132 MS
PH UR STRIP.AUTO: 5 [PH] (ref 5–8)
PLATELET # BLD AUTO: 361 10(3)UL (ref 150–450)
POTASSIUM SERPL-SCNC: 5.2 MMOL/L (ref 3.5–5.1)
PROCALCITONIN SERPL-MCNC: <0.05 NG/ML (ref ?–0.16)
PROT UR STRIP.AUTO-MCNC: NEGATIVE MG/DL
Q-T INTERVAL: 352 MS
QRS DURATION: 80 MS
QTC CALCULATION (BEZET): 411 MS
R AXIS: 73 DEGREES
RBC # BLD AUTO: 4.44 X10(6)UL
RBC UR QL AUTO: NEGATIVE
RSV RNA SPEC NAA+PROBE: NEGATIVE
SALICYLATES SERPL-MCNC: 2.4 MG/DL (ref 2.8–20)
SARS-COV-2 RNA RESP QL NAA+PROBE: NOT DETECTED
SODIUM SERPL-SCNC: 133 MMOL/L (ref 136–145)
SP GR UR STRIP.AUTO: 1.01 (ref 1–1.03)
T AXIS: 67 DEGREES
UROBILINOGEN UR STRIP.AUTO-MCNC: <2 MG/DL
VENTRICULAR RATE: 82 BPM
WBC # BLD AUTO: 7.7 X10(3) UL (ref 4–11)

## 2021-08-19 PROCEDURE — 81003 URINALYSIS AUTO W/O SCOPE: CPT

## 2021-08-19 PROCEDURE — 0241U SARS-COV-2/FLU A AND B/RSV BY PCR (GENEXPERT): CPT

## 2021-08-19 PROCEDURE — 99285 EMERGENCY DEPT VISIT HI MDM: CPT

## 2021-08-19 PROCEDURE — 84145 PROCALCITONIN (PCT): CPT | Performed by: EMERGENCY MEDICINE

## 2021-08-19 PROCEDURE — 36415 COLL VENOUS BLD VENIPUNCTURE: CPT

## 2021-08-19 PROCEDURE — 71045 X-RAY EXAM CHEST 1 VIEW: CPT

## 2021-08-19 PROCEDURE — 80307 DRUG TEST PRSMV CHEM ANLYZR: CPT

## 2021-08-19 PROCEDURE — 85025 COMPLETE CBC W/AUTO DIFF WBC: CPT | Performed by: EMERGENCY MEDICINE

## 2021-08-19 PROCEDURE — 80307 DRUG TEST PRSMV CHEM ANLYZR: CPT | Performed by: EMERGENCY MEDICINE

## 2021-08-19 PROCEDURE — 82077 ASSAY SPEC XCP UR&BREATH IA: CPT | Performed by: EMERGENCY MEDICINE

## 2021-08-19 PROCEDURE — 80179 DRUG ASSAY SALICYLATE: CPT | Performed by: EMERGENCY MEDICINE

## 2021-08-19 PROCEDURE — 80143 DRUG ASSAY ACETAMINOPHEN: CPT | Performed by: EMERGENCY MEDICINE

## 2021-08-19 PROCEDURE — 80053 COMPREHEN METABOLIC PANEL: CPT | Performed by: EMERGENCY MEDICINE

## 2021-08-19 PROCEDURE — 81003 URINALYSIS AUTO W/O SCOPE: CPT | Performed by: EMERGENCY MEDICINE

## 2021-08-19 PROCEDURE — 93010 ELECTROCARDIOGRAM REPORT: CPT

## 2021-08-19 PROCEDURE — 93005 ELECTROCARDIOGRAM TRACING: CPT

## 2021-08-19 PROCEDURE — 0241U SARS-COV-2/FLU A AND B/RSV BY PCR (GENEXPERT): CPT | Performed by: EMERGENCY MEDICINE

## 2021-08-19 PROCEDURE — 94640 AIRWAY INHALATION TREATMENT: CPT

## 2021-08-19 RX ORDER — PROPRANOLOL HYDROCHLORIDE 80 MG/1
80 TABLET ORAL DAILY
COMMUNITY
End: 2021-09-29 | Stop reason: ALTCHOICE

## 2021-08-19 RX ORDER — ONDANSETRON 4 MG/1
4 TABLET, ORALLY DISINTEGRATING ORAL ONCE
Status: COMPLETED | OUTPATIENT
Start: 2021-08-19 | End: 2021-08-19

## 2021-08-19 RX ORDER — LORAZEPAM 1 MG/1
1 TABLET ORAL 3 TIMES DAILY
Status: DISCONTINUED | OUTPATIENT
Start: 2021-08-19 | End: 2021-08-19

## 2021-08-19 RX ORDER — MIRTAZAPINE 15 MG/1
15 TABLET, FILM COATED ORAL NIGHTLY
Status: DISCONTINUED | OUTPATIENT
Start: 2021-08-19 | End: 2021-08-19

## 2021-08-19 RX ORDER — MELATONIN
5000 DAILY
Status: DISCONTINUED | OUTPATIENT
Start: 2021-08-19 | End: 2021-08-19

## 2021-08-19 RX ORDER — ASCORBIC ACID 500 MG
1000 TABLET ORAL DAILY
Status: DISCONTINUED | OUTPATIENT
Start: 2021-08-19 | End: 2021-08-19

## 2021-08-19 RX ORDER — PROPRANOLOL HYDROCHLORIDE 40 MG/1
80 TABLET ORAL 3 TIMES DAILY
Status: DISCONTINUED | OUTPATIENT
Start: 2021-08-19 | End: 2021-08-19

## 2021-08-19 RX ORDER — ALBUTEROL SULFATE 90 UG/1
2 AEROSOL, METERED RESPIRATORY (INHALATION)
COMMUNITY
Start: 2021-08-18 | End: 2021-09-29 | Stop reason: ALTCHOICE

## 2021-08-19 RX ORDER — DOXEPIN HYDROCHLORIDE 50 MG/1
1 CAPSULE ORAL DAILY
Status: DISCONTINUED | OUTPATIENT
Start: 2021-08-19 | End: 2021-08-19

## 2021-08-19 RX ORDER — ALBUTEROL SULFATE 90 UG/1
2 AEROSOL, METERED RESPIRATORY (INHALATION) EVERY 4 HOURS PRN
Status: DISCONTINUED | OUTPATIENT
Start: 2021-08-19 | End: 2021-08-19

## 2021-08-19 RX ORDER — METOPROLOL SUCCINATE 25 MG/1
25 TABLET, EXTENDED RELEASE ORAL 2 TIMES DAILY
Status: DISCONTINUED | OUTPATIENT
Start: 2021-08-19 | End: 2021-08-19

## 2021-08-19 RX ORDER — IBUPROFEN 600 MG/1
600 TABLET ORAL ONCE
Status: COMPLETED | OUTPATIENT
Start: 2021-08-19 | End: 2021-08-19

## 2021-08-19 RX ORDER — CHLORAL HYDRATE 500 MG
1 CAPSULE ORAL DAILY
Status: DISCONTINUED | OUTPATIENT
Start: 2021-08-19 | End: 2021-08-19

## 2021-08-19 NOTE — BH LEVEL OF CARE ASSESSMENT
Crisis Evaluation Assessment    [facility] YOB: 1960   Age 61year old MRN [id number]   Location 656 [facility] Attending Azam Dobbs MD      Patient's legal sex: female  Patient identifies as: female  Misael do anything to end your life? (lifetime): Yes  7.  How long ago did you do any of these?: Between three months and a year ago  Score -  OV: 12 - High Risk   Describe : Pt reports constant SI for approx past 2 months and states she \"can't take it anymore\ but states that symptoms of anxiety include racing thoughts/heart and pacing. Pt reports sleep patterns are \"better\" since starting propanolol and states that she generally gets approx 6-8 hours nightly.  Pt reports appetite is \"very poor\", but states t Needed  Assistive Device Used[de-identified] None  History of falls?: No  Grooming  Level of independence in dressing: Fully Independent  Level of independence to shower/bathe/wash:  Fully Independent  Level of independence in personal grooming tasks (e.g., brushing leta X4  Insight: Fair  Fair/poor insight as evidenced by: Pt states that she needs \"help\" due to medications needing to be adjusted/ \"not working\" per pt  Judgment: Fair  Fair/poor judgment as evidenced by: pt reports coming to ED when symptoms become unco of Individuals Receiving MH/DD Services  Patient Verbalized Understanding: Yes        Diagnoses:  Primary Psychiatric Diagnosis  Major Depressive Disorder, Recurrent Episode, Severe, Without Psychotic Features (F33.2)      Secondary Psychiatric Diagnoses

## 2021-08-19 NOTE — ED NOTES
The patient states that she actually feels much better than when she was at the hospital she has had no significant productive sputum no fevers and a negative procalcitonin level this is probably atelectasis. She will be given her regular inhaler.   No fev

## 2021-08-19 NOTE — ED QUICK NOTES
RN TALKED TO [address] [place] [address], POSSIBLE PLACEMENT AT [address] [place] [address].

## 2021-08-19 NOTE — ED NOTES
Patient accepted accepted to [facility]. Accepting doctor is Dr. Candelario Spine. Transport to be set up at any time. Nursing notified.

## 2021-08-19 NOTE — ED QUICK NOTES
Pt reports recent visit at [facility] and was given inhalers and RX for robitussin for chest congestion, Requests robitussin and inhaler.

## 2021-08-19 NOTE — ED NOTES
TRANSFER SUMMARY:    [place]:  Faxed packet for review. [facility]:  Faxed packet for review. Notified that patient prefers [facility] speaking with Marvel

## 2021-10-20 NOTE — ED INITIAL ASSESSMENT (HPI)
Pt to ED for anxiety. She states \"I need ECT\". Pt takes Ativan at home but states \"it does not work anymore\".

## 2021-10-20 NOTE — BH LEVEL OF CARE ASSESSMENT
Crisis Evaluation Assessment    Josh Castellanos YOB: 1960   Age 64year old MRN [id number]   Location [facility] [facility] Attending Ny Wu MD      Patient's legal sex: female  Patient identifies as: female  Carmina to Self or Others  Patient presents as a low risk of harm. Suicide Risk Assessments:    Source of information for CSSR: Patient  In what setting is the screener performed?: in person  1.  Have you wished you were dead or wished you could go to sleep and Assessment (as applicable):  IBW Calculations  Weight: 147 lb 11.3 oz  BMI (Calculated): 24.6  IBW LBS [place]: 125 LBS  IBW %: 118.17 %  IBW + 10%: 137.5 LBS  IBW - 10%: 112.5 LBS          Abuse Assessment:  Abuse Assessment  Physical Abuse: Denies  Verbal programs. Patient has outpatient providers but it is unclear whether she is compliant with medications. Patient's sister denies safety concerns. Patient safe to discharge.     Risk/Protective Factors  Protective Factors: son    Level of Care Recommendations

## 2021-10-20 NOTE — ED PROVIDER NOTES
Patient Seen in: [facility] Emergency Department      History   Patient presents with: Anxiety/Panic attack    Stated Complaint: anixety.  Patient states she \"hasn't sleep/ate in days\"    Subjective:   HPI    The patient is a 24-year-old female wi hemp             Review of Systems    Positive for stated complaint: anixety. Patient states she \"hasn't sleep/ate in days\"  Other systems are as noted in HPI. Constitutional and vital signs reviewed.       All other systems reviewed and negative except Labs Reviewed   COMP METABOLIC PANEL (14) - Abnormal; Notable for the following components:       Result Value    Sodium 132 (*)     Calculated Osmolality 273 (*)     All other components within normal limits   URINALYSIS WITH CULTURE REFLEX - Abnormal respiratory distress.   No indications of emergent medical condition that would require any further emergent diagnostic or therapeutic intervention or admission to the medical hospital.    She has denied any suicidal or homicidal ideation, and there is no i

## 2021-10-28 ENCOUNTER — APPOINTMENT (OUTPATIENT)
Dept: GENERAL RADIOLOGY | Facility: HOSPITAL | Age: 61
End: 2021-10-28
Attending: EMERGENCY MEDICINE
Payer: COMMERCIAL

## 2021-10-28 PROCEDURE — 71045 X-RAY EXAM CHEST 1 VIEW: CPT | Performed by: EMERGENCY MEDICINE

## 2021-10-28 NOTE — SPIRITUAL CARE NOTE
While at doctor’s office Pt experienced chest pain and SOB. After medical assessment, cardiac alert cancelled.    No family present and  unable to make a spiritual assessment as Pt was being cared for by the medical team.    Chaplains are available

## 2021-10-28 NOTE — ED INITIAL ASSESSMENT (HPI)
Pt reports chest pain and shortness of breath pt noted to get ketamine infusion this am at 0900 at office, pt reports chest pain and shortness of breath in last hour, cardiac alert initiate in field

## 2021-10-28 NOTE — CONSULTS
[facility]    PATIENT'S NAME: Abigail Kamran   ATTENDING PHYSICIAN: Rolly Monroy MD   CONSULTING PHYSICIAN: Baljeet Fisher MD   PATIENT ACCOUNT#:   [id number]    LOCATION:  [facility] 23 23 [facility] 10  MEDICAL RECORD #:   [id number]       DATE OF FELIPA pulse 76, afebrile, saturating 100%. HEENT:  Unremarkable. NECK:  Supple. Normal jugular venous pressure. Carotids brisk without bruits. No thyromegaly. LUNGS:  Clear. HEART:  S1, S2 normal.  No gallop, murmur, rub, or click.   ABDOMEN:  Soft, nonten

## 2021-10-28 NOTE — ED PROVIDER NOTES
Patient Seen in: [facility] AND [facility] Emergency Department      History   Patient presents with:  Chest Pain Angina    Stated Complaint: chest pain    Subjective:   HPI    History is provided by EMS and patient.     49-year-old female with history of severe congestion. Eyes: Negative for visual disturbance. Respiratory: Positive for shortness of breath. Cardiovascular: Positive for chest pain. Gastrointestinal: Negative for abdominal pain. Genitourinary: Negative for dysuria.    Musculoskeletal: Ne Rhythm  Reading: normal for rate, normal for rhythm, no acute ST changes    Cardiac Monitor:    Patient placed on the cardiac monitor and a rhythm strip obtained with the indication of chest pain.   Monitor shows regular rhythm at a rate of 77 bpm.     My i %    .0 150.0 - 450.0 10(3)uL    Neutrophil Absolute Prelim 4.48 1.50 - 7.70 x10 (3) uL    Neutrophil Absolute 4.48 1.50 - 7.70 x10(3) uL    Lymphocyte Absolute 0.92 (L) 1.00 - 4.00 x10(3) uL    Monocyte Absolute 0.41 0.10 - 1.00 x10(3) uL    Eosino those reports. Complicating Factors: The patient already has does not have any pertinent problems on file. to contribute to the complexity of his ED evaluation.     - pt  comfortable with d/c at this time, will d/c pt home now, pt to f/u with Dr. Harry Keller in

## 2021-10-28 NOTE — CONSULTS
Cardiology (consult dictated)    Assessment:  1. Cardiac alert called for patient presenting with chest pain. EKG is normal. Pain is left parasternal and consistent with ischemia.  Review of record shows that she has had numerous ER visits and numerous EKGs

## 2021-12-03 NOTE — BH LEVEL OF CARE ASSESSMENT
Crisis Evaluation Assessment    Hugo Arredondo YOB: 1960   Age 61year old MRN [id number]   Location 6[address] [facility] Attending Cabrera Zavala MD      Patient's legal sex: female  Patient identifies as: female  Misael had already packed a bag this morning in preparation to go inpatient. Sarai Kay clearly said repeatedly that she wanted to kill herself. According to need that she is scared that during we will harm herself in a moment of panic.   During stated that in the m Non-Suicidal Self-Injury:   Pt denies self harm.              Access to Means:  Access to Means  Has access to means to attempt suicide or harm others or property: No  Discussion of Removal of Access to Means: pt stated she has no plan or atte psychiatrist has an appointment for Monday morning at 10am.  Per.   Patient's previous psychiatric assessment in 2018 she reports that she was triggered and started to experience depression, the trigger was that her sister had passed away and her dog had pa sores, surgical wounds, bandages/dressings?: No  Patient uses any kind of pump?: No  Does the patient need a BiPAP or CPAP?: No  Intellectual disability reported?   Intellectual Disability?: No  Reported Social/Emotional Functioning Level  Describe: Pt lack Poor  Fair/poor insight as evidenced by: pt requested to have a specific call her and admit her even when this writer explained the process of inpatient to pt  Judgment: Poor  Fair/poor judgment as evidenced by: pt is unable to process panic attacks and ex medication and shared that Dr. Jennifer Wolff suggested med compliance for assistance with coping with her panic attacks. Pt's Everton Haywood, pt's new psychiatrist has an appointment for Monday morning at 10am with her new psychiatrist Gilbert Bernard from [facility].   Patient none

## 2022-09-20 PROBLEM — F33.2 MDD (MAJOR DEPRESSIVE DISORDER), RECURRENT EPISODE, SEVERE (HCC): Status: ACTIVE | Noted: 2022-09-20

## 2022-10-06 NOTE — ED INITIAL ASSESSMENT (HPI)
Patient presents stating \"I can't breathe\" and hyperventilating. Oxygen 100% on room air. She is intermittently answering questions at this time but has a strong radial pulse. She reports being discharged from [facility] yesterday and reports anxiety.

## 2022-10-06 NOTE — BH LEVEL OF CARE ASSESSMENT
Crisis Evaluation Assessment    Dia Nicole YOB: 1960   Age 58year old MRN [id number]   [address] [place] [address] Attending Malathi Gomez MD      Patient's legal sex: female  Patient identifies as: female  Patient's birth sex: female  Preferred pronouns: [facility]    Date of Service: 10/6/2022    Referral Source:  Referral Source  Referral Source: Friend/Relative  Referral Source Info: Patient's son dropped patient off to the ED     Reason for Crisis Evaluation   \"I was discharged yesterday. I'm not ok\". I can't handle it. I feel depressed. The medicine isn't working. I'm always get scared when I go home. The anxiety is too much. It's not under control and I can't stand the way it makes me feels\". When asked about what happened from yesterday to today, patient stated \"Anxiety. I can't get it under control. There's no medicine to help me. I feel like I want to go to sleep and not wake up\". Patient reports that she called her sister and then called son to have him bring patient back to the ED. \"I shouldn't have left. I thought I was ok\". Patient is unable to report any trigger for increased anxiety. \"I just couldn't control it and I got scared\". Patient reports that she is \"scared\" because she will feel her heart pounding and can \"never calm down\". Patient's BP and pulse were noted to be stable in the ED. Patient reports that anxiety began about 4 years ago after a culmunation of events occurred. Patient states that her sister , she had to have lung surgery which then lead to early long term and she had to put her dog down. Patient presents with all or nothing thinking and appears resistant to professional recommendations. A review of patient's chart shows that patient has been prescribed multiple medications and still states that \"no medications help\" and will frequently request/demand medications be changed. Patient has a low adherence to prescribed medications. Patient's EMR shows that patient has been prescribed 15+ medications over the past 6 months all of which patient states \"have not been helpful\" and discontinued before therapeutic benefit could have been achieved. Collateral  This writer called patient's son, Ginny Johnston, for collateral information. Son confirms that he has been managing and controling patient's medications since discharge from inpatient. Son states that he will only give patient 2 days worth of medications at a time and then keeps that remained of medications at his home. This writer also spoke to patient's sister, Christian Montero, via phone. Sister reports overall general frustration with patient and her lack of adherence to treatment. Sister states that patient has been recommended to attend residential treatment several times following admissions but has refused to go. Sister states that patient does not appear to want to get better. Sister states that patient has \"done this four times before\". Sister clarified that patient has discharged from inpatient and returned in less than 24 hours for reassessment four times previously. Sister states that patient has had 16 admissions total over the past 4 years. Sister states that patient will complain about feeling anxious and list off several physical complaints but will not do \"anything to get better\". Sister reported to this writer that \"putting her back inpatient is just stupid. It would be ridiculous to do this again\". Risk to Self or Others  Patient denies any HI with plan or intent. Patient denies any hx of violence or aggression. Suicide Risk Assessments:    Source of information for CSSR: Patient  In what setting is the screener performed?: in person  1. Have you wished you were dead or wished you could go to sleep and not wake up? (past 30 days): Yes  2. Have you actually had any thoughts of killing yourself? (past 30 days): Yes  3. Have you been thinking about how you might kill yourself? (past 30 days): Yes (Patient attempted suicide via overdose last month (within 30 days). Patient denies any intention since ovderdose last month. Patient denies feeling like a danger to self.)  4. Have you had these thoughts and had some intention of acting on them? (past 30 days): Yes (Patient attempted suicide via overdose last month (within 30 days). Patient denies any intention since ovderdose last month. Patient denies feeling like a danger to self.)  5a. Have you started to work out or worked out the details of how to kill yourself? (past 30 days): Yes (Patient attempted suicide via overdose last month (within 30 days). Patient denies any intention since ovderdose last month. Patient denies feeling like a danger to self.)  5b. Do you intend to carry out this plan? (past 30 days): Yes (Patient attempted suicide via overdose last month (within 30 days). Patient denies any intention since ovderdose last month. Patient denies feeling like a danger to self. When asked if patient has plan to harm self, patient denied.)  6. Have you ever done anything, started to do anything, or prepared to do anything to end your life? (lifetime): Yes  7. How long ago did you do any of these?: Within the last three months  [place] -  OV: 13 - High Risk   Describe : Past overdosed on medications last month. Patient has a total of 6 prior suicide attempts all by overdosing with last attempt being on 9/18/2022. Patient denies any plan or intent to harm self since last attempt. Is your experience of thoughts of dying by suicide: A Solution to a Problem  Protective Factors: Family. Past Suicidal Ideation: Ideation;Method/Plan;Intention;Rehersal/Research; Attempt  Describe: 6 previous attempts all by overdose. Last overdose was on 9/18/2022     Family History or Personal Lived Experience of Loss or Near Loss by Suicide: Denies      Patient denies any current suicidal thoughts. Patient reports having passive thoughts of death.  Patient states that she wishes she could \"go to sleep and not wake up\" due to anxiety. Patient continued to deny plan or intent to harm self throughout entirety of assessment. Patient states that she is not a danger to self or others. Patient reports a hx of 6 previous suicide attempts all by overdosing with last attempt being on 9/18/2022. Patient states that she does not have access to medications at home except for a 2 day supply at a time. Patient states that son has medications that he keeps for patient to ensure safety. This was part of patient's discharge/safety plan when patient was discharged yesterday from inpatient at [facility]. Non-Suicidal Self-Injury:   Patient denies any self-injury both past and present. Access to Means:  Access to Means  Has access to means to attempt suicide or harm others or property: No  Access to Firearm/Weapon: No  Do you have a firearm owner ID card?: No    Protective Factors:   Protective Factors: Family. Review of Psychiatric Systems:  Patient reports increased depression. Patient endorses symptoms of depression to include feelings of hopelessness, low mood and passive thoughts about death. Patient also reports significant symptoms of anxiety. Patient endorses symptoms of anxiety to include feeling scared, constant panic, racing heart, SOB, shakiness and feelings of doom. Patient reports a decrease in appetite. Patient states that she has not eaten much since discharge from [facility] yesterday. Patient reports low appetite due to feeling nauseous from anxiety. Patient reports that she slept well last night following discharge. Patient slept nine hours last night. Patient any A/V hallucinations. Patient denies any internal stimuli. Patient does not appear internally preoccupied. Patient does not display any symptoms of joanie or hypomania. No delusional thinking noted. Substance Use:  Patient denies any alcohol or drug use.  Patient's UDS was positive for benzos however patient is prescribed benzos. Functional Achievement:   Patient able to complete ADL's. Patient cares for home. Current Treatment and Treatment History:  Patient has a hx of anxiety and depression and likely BPD. Patient has multiple prior inpatient admissions with last admission being at [facility]. Patient was discharged yesterday. Patient has 16 prior inpatient admissions that have occurred over the past 4 years. Patient has been inpatient at [facility], [facility] and [facility]. Patient has also attending PHP and IOP programming 4 times each through [facility] and [facility]. Patient was recommended to attend PHP as a step-down from inpatient yesterday but declined stating that \"I don't want to. I just want to feel better and get back to my life\". Patient currently sees Dr. Marybeth Alonzo through [facility] in [place]. Per charting, patient will change providers frequently. Patient is currently prescribed Pristiq, Ativan and valium. Patient sees therapist Chris Johnson through [address] [place] [address]. Patient sees therapist once per month. Per discharge plan, patient was going to be seeing therapist 3x per week since she would not attend PHP. Patient has appointment with outpatient psychiatrist tomorrow (10/7/2022). Relevant Social History:  Patient resides in [place], [place] alone. Patient reports that she was  once for about 5 years and then . Patient  about 25 years ago. Patient reports having a son that lives in the area and is involved in patient's life. Patient reports being retired. 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 Patient denies any legal issues. Patient reports family, friends and Religious as support system. Patient reports a family hx of mental illness to include depression. Mother and sister both had depression.      EDP Assessment (as applicable):  IBW Calculations  Weight: 159 lb 13.3 oz    Abuse Assessment:  Abuse Assessment  Physical Abuse: Denies  Verbal Abuse: Denies  Sexual Abuse: Denies  Neglect: Denies  Does anyone say or do something to you that makes you feel unsafe?: No  Have You Ever Been Harmed by a Partner/Caregiver?: No  Health Concerns r/t Abuse: No  Possible Abuse Reportable to[de-identified] Not appropriate for reporting to authorities    Mental Status Exam:   General Appearance  Characteristics: Appropriate clothing  Eye Contact: Indirect  Psychomotor Behavior  Gait/Movement: Normal  Abnormal movements: None  Posture: Relaxed  Rate of Movement: Normal  Mood and Affect  Mood or Feelings: Anxious; Pessimistic  Anxiety Level- CRISTOBAL only: Moderate  Appropriateness of Affect: Congruent to mood  Range of Affect: Blunted  Stability of Affect: Stable  Speech  Rate of Speech: Appropriate  Flow of Speech: Appropriate  Intensity of Volume: Ordinary  Clarity: Clear  Cognition  Concentration: Unimpaired  Memory: Recent memory intact; Remote memory intact  Orientation Level: Oriented X4  Insight: Poor  Judgment: Fair  Thought Patterns  Clarity/Relevance: Coherent  Flow: Organized  Content: Ordinary  Level of Consciousness: Alert    Disposition:  PHP but refused. After consultation with patient, patient's sister, patient's son, last psychiatric provider through [facility], Radha Ratliff and ED physician Dr. Andres Denney, patient is recommended for [facility] level of care however patient refused program. \"I don't want to do that and I'm not going to do that\". Patient was then referred to follow-up with outpatient providers and has appointment with psychiatrist tomorrow. Assessment Summary:   Patient is a 58year old white, English speaking female who presents to the ED with complaints of anxiety. Patient was just discharged from inpatient at [facility] yesterday. Patient was inpatient from 9/20-10/5 after being transferred from [facility]. Patient has 16 prior inpatient admissions over the past 4 years. Patient has poor follow through with outpatient treatment.  Patient was recommended to follow-up with PHP as a step-down from inpatient yesterday but refused. Patient is currently presented Pristiq, ativan and valium. Patient's son monitors and dispensed medications due to patient having a hx of overdosing. Patient is only given a 2 day supply of medications at a time. Patient states that her anxiety is \"out of control\" and is requesting to be admitted again. Patient could not identify any trigger for increase in anxiety from yesterday to today. Patient denies any current SI with plan or intent. Patient does have a hx of overdosing in a suicide attempt 6 times previously. Last attempt was on 9/18/22. Patient currently sees Dr. Fito Louis with [facility] in [place]. Patient has an outpatient appointment with psychiatrist tomorrow. Patient denies any alcohol or drug use. Patient was alert and oriented. Patient was demanding and dramatic in presentation. Patient denies any HI. No SIB reported. Risk/Protective Factors  Protective Factors: Family.     Diagnoses:  Primary Psychiatric Diagnosis  Major depression, recurrent, unspec   Secondary Psychiatric Diagnoses  NICOL  Pervasive Diagnoses  Cluster B traits (specifically BPD)  Pertinent Non-Psychiatric Diagnoses  Deferred    Jake Samano LCSW

## 2022-10-06 NOTE — PROGRESS NOTES
10/06/22 1621   Level of Care Recommendations   Consulted with Shraddha Brownlee under Dr. Gera Franz and Dr Isela Shah   Level of Care Recommendation Partial Hospitalization   Program Adult; Anxiety   Partial Criteria Moderate to severe impairment in role performance; Inablility to manage intensity of symptoms   Refused Treatment Yes   Refused Treatment Reason Other   Refused Treatment Other Reason Doesn't want to do program   Education Provided Call 911 in an Emergency;[facility] Crisis Line Number;Advised to call if condition worsens; Advised to call with questions   Transferred N   Sign-In   Patient Verbalized Understanding Yes

## 2022-10-17 PROBLEM — F33.9 MDD (MAJOR DEPRESSIVE DISORDER), RECURRENT EPISODE (HCC): Status: ACTIVE | Noted: 2022-10-17

## 2022-10-17 PROBLEM — F41.1 GENERALIZED ANXIETY DISORDER: Status: RESOLVED | Noted: 2021-06-25 | Resolved: 2022-10-17

## 2022-10-17 PROBLEM — F33.9 MDD (MAJOR DEPRESSIVE DISORDER), RECURRENT EPISODE (HCC): Status: RESOLVED | Noted: 2022-10-17 | Resolved: 2022-10-17

## 2022-10-17 PROBLEM — F33.2 MDD (MAJOR DEPRESSIVE DISORDER), RECURRENT EPISODE, SEVERE (HCC): Status: RESOLVED | Noted: 2022-09-20 | Resolved: 2022-10-17

## 2022-10-17 PROBLEM — F33.2 MAJOR DEPRESSIVE DISORDER, RECURRENT EPISODE, SEVERE (HCC): Chronic | Status: RESOLVED | Noted: 2021-03-23 | Resolved: 2022-10-17

## 2022-10-17 PROBLEM — F32.9 MAJOR DEPRESSIVE DISORDER: Status: RESOLVED | Noted: 2021-05-10 | Resolved: 2022-10-17

## 2022-10-25 PROBLEM — T14.91XA SUICIDE ATTEMPT (HCC): Status: ACTIVE | Noted: 2022-10-25

## 2022-10-25 PROBLEM — T42.4X2A INTENTIONAL BENZODIAZEPINE OVERDOSE, INITIAL ENCOUNTER (HCC): Status: ACTIVE | Noted: 2022-10-25

## 2022-10-26 ENCOUNTER — APPOINTMENT (OUTPATIENT)
Dept: CV DIAGNOSTICS | Facility: HOSPITAL | Age: 62
End: 2022-10-26
Attending: HOSPITALIST
Payer: COMMERCIAL

## 2022-10-26 PROBLEM — F33.2 SEVERE EPISODE OF RECURRENT MAJOR DEPRESSIVE DISORDER, WITHOUT PSYCHOTIC FEATURES (HCC): Status: ACTIVE | Noted: 2021-06-16

## 2022-10-26 PROCEDURE — 93306 TTE W/DOPPLER COMPLETE: CPT | Performed by: HOSPITALIST

## 2022-11-11 ENCOUNTER — APPOINTMENT (OUTPATIENT)
Dept: CT IMAGING | Facility: HOSPITAL | Age: 62
End: 2022-11-11
Attending: INTERNAL MEDICINE
Payer: COMMERCIAL

## 2022-11-11 ENCOUNTER — APPOINTMENT (OUTPATIENT)
Dept: CT IMAGING | Facility: HOSPITAL | Age: 62
End: 2022-11-11
Attending: EMERGENCY MEDICINE
Payer: COMMERCIAL

## 2022-11-11 ENCOUNTER — APPOINTMENT (OUTPATIENT)
Dept: GENERAL RADIOLOGY | Facility: HOSPITAL | Age: 62
End: 2022-11-11
Attending: EMERGENCY MEDICINE
Payer: COMMERCIAL

## 2022-11-11 PROBLEM — T14.91XA SUICIDAL BEHAVIOR WITH ATTEMPTED SELF-INJURY (HCC): Status: ACTIVE | Noted: 2022-11-11

## 2022-11-11 PROBLEM — R55 SYNCOPE AND COLLAPSE: Status: ACTIVE | Noted: 2022-11-11

## 2022-11-11 PROBLEM — E87.20 METABOLIC ACIDOSIS: Status: ACTIVE | Noted: 2022-11-11

## 2022-11-11 PROBLEM — R41.82 ALTERED MENTAL STATUS, UNSPECIFIED ALTERED MENTAL STATUS TYPE: Status: ACTIVE | Noted: 2022-11-11

## 2022-11-11 PROCEDURE — 74177 CT ABD & PELVIS W/CONTRAST: CPT | Performed by: INTERNAL MEDICINE

## 2022-11-11 PROCEDURE — 72125 CT NECK SPINE W/O DYE: CPT | Performed by: EMERGENCY MEDICINE

## 2022-11-11 PROCEDURE — 71045 X-RAY EXAM CHEST 1 VIEW: CPT | Performed by: EMERGENCY MEDICINE

## 2022-11-11 PROCEDURE — 70450 CT HEAD/BRAIN W/O DYE: CPT | Performed by: EMERGENCY MEDICINE

## 2022-11-17 ENCOUNTER — APPOINTMENT (OUTPATIENT)
Dept: CT IMAGING | Facility: HOSPITAL | Age: 62
End: 2022-11-17
Attending: EMERGENCY MEDICINE
Payer: COMMERCIAL

## 2022-11-17 ENCOUNTER — HOSPITAL ENCOUNTER (EMERGENCY)
Facility: HOSPITAL | Age: 62
Discharge: NURSING, REHAB OR LONG-TERM CARE | End: 2022-11-18
Attending: EMERGENCY MEDICINE
Payer: COMMERCIAL

## 2022-11-17 ENCOUNTER — APPOINTMENT (OUTPATIENT)
Dept: CV DIAGNOSTICS | Facility: HOSPITAL | Age: 62
End: 2022-11-17
Attending: EMERGENCY MEDICINE
Payer: COMMERCIAL

## 2022-11-17 DIAGNOSIS — R94.31 ABNORMAL EKG: ICD-10-CM

## 2022-11-17 DIAGNOSIS — R45.851 SUICIDAL IDEATION: ICD-10-CM

## 2022-11-17 DIAGNOSIS — S09.8XXD BLUNT HEAD TRAUMA, SUBSEQUENT ENCOUNTER: Primary | ICD-10-CM

## 2022-11-17 LAB
ALBUMIN SERPL-MCNC: 4.4 G/DL (ref 3.4–5)
ALBUMIN/GLOB SERPL: 1 {RATIO} (ref 1–2)
ALP LIVER SERPL-CCNC: 61 U/L
ALT SERPL-CCNC: 21 U/L
AMPHET UR QL SCN: NEGATIVE
ANION GAP SERPL CALC-SCNC: 10 MMOL/L (ref 0–18)
APAP SERPL-MCNC: <2 UG/ML (ref 10–30)
AST SERPL-CCNC: 14 U/L (ref 15–37)
ATRIAL RATE: 91 BPM
BASOPHILS # BLD AUTO: 0.01 X10(3) UL (ref 0–0.2)
BASOPHILS NFR BLD AUTO: 0.1 %
BENZODIAZ UR QL SCN: NEGATIVE
BILIRUB SERPL-MCNC: 0.6 MG/DL (ref 0.1–2)
BILIRUB UR QL STRIP.AUTO: NEGATIVE
BUN BLD-MCNC: 11 MG/DL (ref 7–18)
CALCIUM BLD-MCNC: 10.8 MG/DL (ref 8.5–10.1)
CANNABINOIDS UR QL SCN: NEGATIVE
CHLORIDE SERPL-SCNC: 101 MMOL/L (ref 98–112)
CLARITY UR REFRACT.AUTO: CLEAR
CO2 SERPL-SCNC: 23 MMOL/L (ref 21–32)
COCAINE UR QL: NEGATIVE
CREAT BLD-MCNC: 1.04 MG/DL
CREAT UR-SCNC: 20.6 MG/DL
EOSINOPHIL # BLD AUTO: 0.05 X10(3) UL (ref 0–0.7)
EOSINOPHIL NFR BLD AUTO: 0.6 %
ERYTHROCYTE [DISTWIDTH] IN BLOOD BY AUTOMATED COUNT: 12.4 %
GFR SERPLBLD BASED ON 1.73 SQ M-ARVRAT: 61 ML/MIN/1.73M2 (ref 60–?)
GLOBULIN PLAS-MCNC: 4.4 G/DL (ref 2.8–4.4)
GLUCOSE BLD-MCNC: 89 MG/DL (ref 70–99)
GLUCOSE UR STRIP.AUTO-MCNC: NEGATIVE MG/DL
HCT VFR BLD AUTO: 44.2 %
HGB BLD-MCNC: 15.6 G/DL
IMM GRANULOCYTES # BLD AUTO: 0.05 X10(3) UL (ref 0–1)
IMM GRANULOCYTES NFR BLD: 0.6 %
LEUKOCYTE ESTERASE UR QL STRIP.AUTO: NEGATIVE
LYMPHOCYTES # BLD AUTO: 1.42 X10(3) UL (ref 1–4)
LYMPHOCYTES NFR BLD AUTO: 16.6 %
MCH RBC QN AUTO: 32 PG (ref 26–34)
MCHC RBC AUTO-ENTMCNC: 35.3 G/DL (ref 31–37)
MCV RBC AUTO: 90.6 FL
MDMA UR QL SCN: NEGATIVE
MONOCYTES # BLD AUTO: 0.62 X10(3) UL (ref 0.1–1)
MONOCYTES NFR BLD AUTO: 7.3 %
NEUTROPHILS # BLD AUTO: 6.4 X10 (3) UL (ref 1.5–7.7)
NEUTROPHILS # BLD AUTO: 6.4 X10(3) UL (ref 1.5–7.7)
NEUTROPHILS NFR BLD AUTO: 74.8 %
NITRITE UR QL STRIP.AUTO: NEGATIVE
OPIATES UR QL SCN: NEGATIVE
OSMOLALITY SERPL CALC.SUM OF ELEC: 277 MOSM/KG (ref 275–295)
OXYCODONE UR QL SCN: NEGATIVE
P AXIS: 56 DEGREES
P-R INTERVAL: 150 MS
PH UR STRIP.AUTO: 8 [PH] (ref 5–8)
PLATELET # BLD AUTO: 434 10(3)UL (ref 150–450)
POTASSIUM SERPL-SCNC: 3.7 MMOL/L (ref 3.5–5.1)
PROT SERPL-MCNC: 8.8 G/DL (ref 6.4–8.2)
PROT UR STRIP.AUTO-MCNC: NEGATIVE MG/DL
Q-T INTERVAL: 358 MS
QRS DURATION: 84 MS
QTC CALCULATION (BEZET): 440 MS
R AXIS: 82 DEGREES
RBC # BLD AUTO: 4.88 X10(6)UL
RBC UR QL AUTO: NEGATIVE
SALICYLATES SERPL-MCNC: 4 MG/DL (ref 2.8–20)
SODIUM SERPL-SCNC: 134 MMOL/L (ref 136–145)
SP GR UR STRIP.AUTO: 1 (ref 1–1.03)
T AXIS: 68 DEGREES
TROPONIN I HIGH SENSITIVITY: 10 NG/L
UROBILINOGEN UR STRIP.AUTO-MCNC: <2 MG/DL
VENTRICULAR RATE: 91 BPM
WBC # BLD AUTO: 8.6 X10(3) UL (ref 4–11)

## 2022-11-17 PROCEDURE — 93306 TTE W/DOPPLER COMPLETE: CPT | Performed by: EMERGENCY MEDICINE

## 2022-11-17 PROCEDURE — 70450 CT HEAD/BRAIN W/O DYE: CPT | Performed by: EMERGENCY MEDICINE

## 2022-11-17 PROCEDURE — 72125 CT NECK SPINE W/O DYE: CPT | Performed by: EMERGENCY MEDICINE

## 2022-11-17 RX ORDER — KETOROLAC TROMETHAMINE 15 MG/ML
15 INJECTION, SOLUTION INTRAMUSCULAR; INTRAVENOUS ONCE
Status: COMPLETED | OUTPATIENT
Start: 2022-11-17 | End: 2022-11-17

## 2022-11-17 RX ORDER — TRAZODONE HYDROCHLORIDE 50 MG/1
100 TABLET ORAL NIGHTLY
Status: DISCONTINUED | OUTPATIENT
Start: 2022-11-17 | End: 2022-11-17

## 2022-11-17 RX ORDER — LORAZEPAM 2 MG/ML
1 INJECTION INTRAMUSCULAR ONCE
Status: COMPLETED | OUTPATIENT
Start: 2022-11-17 | End: 2022-11-17

## 2022-11-17 RX ORDER — ONDANSETRON 4 MG/1
4 TABLET, ORALLY DISINTEGRATING ORAL ONCE
Status: COMPLETED | OUTPATIENT
Start: 2022-11-17 | End: 2022-11-17

## 2022-11-17 NOTE — ED QUICK NOTES
Security will be taking patients car keys and moving her vehicle from the ramp at [facility]. Patients car keys will be with security.

## 2022-11-17 NOTE — ED INITIAL ASSESSMENT (HPI)
EMS report that the PT had a syncope episode in the bathroom while checking herself in to [facility]. PT states that she \"passed out in the bathroom\" at [facility] and that she hit her head on the toilet.

## 2022-11-17 NOTE — BH PROGRESS NOTE
Pt arrived at [facility] front lobby, went to use the bathroom and then experienced an unwitnessed fall, claims she hit her head on the bathroom sink. Per  staff, pt lost consciousness for no longer than 1 minute. VSS. 9-11 was called. Pt making statements that she wants to die, wants to kill herself. Writer petitioned the pt.
Daughter

## 2022-11-18 VITALS
HEIGHT: 67 IN | DIASTOLIC BLOOD PRESSURE: 60 MMHG | TEMPERATURE: 98 F | RESPIRATION RATE: 16 BRPM | SYSTOLIC BLOOD PRESSURE: 128 MMHG | OXYGEN SATURATION: 99 % | BODY MASS INDEX: 23.54 KG/M2 | WEIGHT: 150 LBS | HEART RATE: 70 BPM

## 2022-11-18 PROBLEM — F33.2 MAJOR DEPRESSIVE DISORDER, RECURRENT SEVERE WITHOUT PSYCHOTIC FEATURES (HCC): Status: ACTIVE | Noted: 2021-06-16

## 2022-11-18 LAB
GLUCOSE BLD-MCNC: 151 MG/DL (ref 70–99)
SARS-COV-2 RNA RESP QL NAA+PROBE: NOT DETECTED

## 2022-11-18 PROCEDURE — 90792 PSYCH DIAG EVAL W/MED SRVCS: CPT | Performed by: OTHER

## 2022-11-18 RX ORDER — TRAZODONE HYDROCHLORIDE 50 MG/1
150 TABLET ORAL NIGHTLY PRN
Status: DISCONTINUED | OUTPATIENT
Start: 2022-11-18 | End: 2022-11-18

## 2022-11-18 RX ORDER — ONDANSETRON 4 MG/1
4 TABLET, ORALLY DISINTEGRATING ORAL ONCE
Status: COMPLETED | OUTPATIENT
Start: 2022-11-18 | End: 2022-11-18

## 2022-11-18 RX ORDER — NICOTINE 21 MG/24HR
1 PATCH, TRANSDERMAL 24 HOURS TRANSDERMAL DAILY
Status: DISCONTINUED | OUTPATIENT
Start: 2022-11-18 | End: 2022-11-18

## 2022-11-18 RX ORDER — LORAZEPAM 1 MG/1
1 TABLET ORAL ONCE
Status: COMPLETED | OUTPATIENT
Start: 2022-11-18 | End: 2022-11-18

## 2022-11-18 RX ORDER — LORAZEPAM 2 MG/ML
1 INJECTION INTRAMUSCULAR ONCE
Status: COMPLETED | OUTPATIENT
Start: 2022-11-18 | End: 2022-11-18

## 2022-11-18 RX ORDER — LORAZEPAM 1 MG/1
1 TABLET ORAL 3 TIMES DAILY
Status: DISCONTINUED | OUTPATIENT
Start: 2022-11-18 | End: 2022-11-18

## 2022-11-18 RX ORDER — MIDODRINE HYDROCHLORIDE 5 MG/1
5 TABLET ORAL ONCE
Status: COMPLETED | OUTPATIENT
Start: 2022-11-18 | End: 2022-11-18

## 2022-11-18 RX ORDER — ACETAMINOPHEN 500 MG
1000 TABLET ORAL ONCE
Status: COMPLETED | OUTPATIENT
Start: 2022-11-18 | End: 2022-11-18

## 2022-11-18 RX ORDER — LAMOTRIGINE 25 MG/1
25 TABLET ORAL 2 TIMES DAILY
Status: DISCONTINUED | OUTPATIENT
Start: 2022-11-18 | End: 2022-11-18

## 2022-11-18 RX ORDER — MIDODRINE HYDROCHLORIDE 2.5 MG/1
2.5 TABLET ORAL ONCE
Status: DISCONTINUED | OUTPATIENT
Start: 2022-11-18 | End: 2022-11-18

## 2022-11-18 RX ORDER — HALOPERIDOL 5 MG/ML
5 INJECTION INTRAMUSCULAR ONCE
Status: COMPLETED | OUTPATIENT
Start: 2022-11-18 | End: 2022-11-18

## 2022-11-18 NOTE — ED QUICK NOTES
RN to RN report given to Rockland Skiff at [phone]. Per Rockland Skiff they will review and call us back if patient is accepted.

## 2022-11-18 NOTE — ED NOTES
Received a call from Ron[place] at [facility] stating that they will not be able to accommodate due to the fall history .      RN to RN completed with Mayank Vo,

## 2022-11-18 NOTE — ED NOTES
Informed Robbin Cordova about acceptance to [facility], and if she wanted to contact anyone to inform them of transfer. Robbin Cordova stated that she wasn't going to [facility] because its a \"dive. \"

## 2022-11-18 NOTE — ED NOTES
Received a call from Nurse stating that Patient was accepted to [facility]. Nurse wanted to know accepting Doctor. Called [facility] and was informed that accepting Doctor is Dr. Juanito Gloria. Transportation to be arranged for 6:00PM    Informed Nurse of accepting Doctor.

## 2022-11-18 NOTE — CERTIFICATION
Ref: [id number] 5/3-403, 5/3-602, 5/3-607, 5/3-610    5/3-702, 5/3-813, 5/4-306, 5/4-402, 5/4-403    5/4-405, 5/4-501, 5/4-611, 4/1-140   Inpatient Certificate  Re: Trena Parada    (name)     I personally informed the above-named individual of the purpose of this examination and that he or she did not have to speak to me, and that any statements made might be related in court as to the individual's clinical condition or need for services. Additionally, if this examination was for the purpose of determining that the above-named individual is developmentally disabled and dangerous, I informed the individual of his or her right to speak with a relative, friend or  before the examination, and of his or her right to have an  appointed for him or her if he or she so desired. Electronically signed by Anais Breaux MD    Signature of Examiner     On                 November 18th , 2022 , at    1:00  [] a.m.  [x] p.m.,  I personally examined the    (date)  (year)  (time)    above-named individual. The examination was conducted at [facility].  Based on the foregoing examination, it is my opinion that he or she is:  [x]  A person with mental illness who, because of his or her illness is reasonably expected, unless treated on an inpatient basis, to engage in conduct placing such person or another in physical harm or in reasonable expectation of being physically harmed;   []  A person with mental illness who, because of his or her illness is unable to provide for his or her basic physical needs so as to guard himself or herself from serious harm, without the assistance of family or others, unless treated on an inpatient basis;   []  A person with mental illness who: refuses treatment or is not adhering adequately to prescribed treatment; because of the nature of his or her illness is unable to understand his or her need for treatment; and if not treated on an inpatient basis, is reasonably expected based on his or her behavioral history, to suffer mental or emotional deterioration and is reasonably expected, after such deterioration, to meet the criteria of either paragraph one or paragraph two above;   []  An individual who is developmentally disabled and unless treated on an in-patient basis is reasonably expected to inflict serious physical harm upon himself or herself or others in the near future, and/or   [x]  Is in need of immediate hospitalization for the prevention of such harm. I base my opinion on the following (including clinical observations, factual information):  I examined the patient in person. 59 yo with BPD, NICOL, panic attacks, and severe MDD presents with suicidal ideation with a plan to crash her car or get hit by a train or die by CO poisoning. She feels like she is a burden to her son and is better off dead. Needs psych med adjustments and the college of [employer].       I believe that the individual is subject to: []  Involuntary inpatient admission and is not in need of immediate hospitalization   (check one) [x]  Involuntary inpatient admission and is in need of immediate hospitalization    []  Judicial inpatient admission and is not in need of immediate hospitalization    []  Judicial inpatient admission and is in need of immediate hospitalization     Date: 11/18/2022 Signature: Electronically signed by Magdalena Maravilla MD   Title: MD Printed Name: Cristiana Hawley [phone] ([id number]) Inpatient Certificate    Printed by [employer]

## 2022-11-18 NOTE — ED NOTES
[facility] and referral was made over the phone.  Referral Packet has been faxed for review,

## 2022-11-18 NOTE — ED NOTES
Petition and certificate updated. New P&C faxed to [address] and certificate scanned into patient's chart. Originals placed on patient's paper chart.

## 2022-11-18 NOTE — ED NOTES
Received a call from [facility] from [facility] stating that they are not able to accommodate. Called the following Hospitals for in-patient:     [facility]- It was suggested to call back later this afternoon to inquire about any discharges    [facility]- Spoke with Regenia Olszewski to make referral over the phone. Referral Packet has been faxed for review.     [facility]- No beds available    [facility]- No beds available

## 2022-11-18 NOTE — ED NOTES
Ede Suarez and spoke with Emmanuel Cruz to make referral over the phone. Referral Packet has been faxed for review. Spoke with Nurse and asked for genexpert COVID test to be completed. Once resulted, will fax to [facility]. Spoke with Jennifer Palacios to discuss plan of care. Informed Jennifer Palacios that [facility] is currently reviewing for possible placement.

## 2022-11-18 NOTE — ED NOTES
Patient agreeable to sign voluntary. Voluntary and rights form completed. Patient provided with copies. Voluntary, rights and petition all scanned into patient's chart. Originals placed on patient's paper chart.

## 2022-11-18 NOTE — ED NOTES
Faxed over COVID result to [facility], [place], [facility]

## 2022-11-18 NOTE — ED PROVIDER NOTES
Patient got up to the bathroom around 830. When she came back from the bathroom she became lightheaded and fell to the ground. She did not hit her head. I assessed her after the fall and she had some abrasions to her knees but no head injury. Head was atraumatic. She was slightly tachycardic. Her Accu-Chek was normal.  She was placed in a monitored room. She was given IV fluids. She has a history of orthostatic hypotension and falls frequently. She fell earlier in the day as well.   She is awaiting psychiatric placement

## 2022-11-18 NOTE — ED QUICK NOTES
Pt returned to room and began screaming and pounding on the door. Pt redirected to sit on cart for pt and staff safety. Pt medicated per order. Tolerated well.

## 2022-11-18 NOTE — ED QUICK NOTES
Pt accepted to [facility], transport to be arranged at 6pm. MD notified. Pt handoff report given to [facility].

## 2022-11-18 NOTE — ED QUICK NOTES
MD notified, pt taken off the monitor and IVF discontinued due to pt wrapping a cord around her neck yelling help me. Charge RN notified.

## 2022-11-18 NOTE — ED QUICK NOTES
While walking back from bathroom pt stated she did not feel well and fell to the ground. Witnessed fall pt did not hit her head, no LOC. Pt brought back to the room, MD at bedside. Pt requesting ice packs for knees. Pt reports dizziness states hx of orthostatic hypotension.

## 2022-11-18 NOTE — ED NOTES
Received a call from [place] ([facility]) at [facility] stating that they will not be able to accommodate. Lencho Zarate and was informed that Patient would need a 1:1 due to the fall risk. [facility] unable to take referral due to not taking 1:1 staffing. Called [phone] [facility] [address] to make referral over the phone. Referral Packet has been faxed to both hospitals for review.

## 2022-11-23 LAB
ATRIAL RATE: 123 BPM
P AXIS: 81 DEGREES
P-R INTERVAL: 134 MS
Q-T INTERVAL: 308 MS
QRS DURATION: 82 MS
QTC CALCULATION (BEZET): 440 MS
R AXIS: 75 DEGREES
T AXIS: 57 DEGREES
VENTRICULAR RATE: 123 BPM

## 2023-02-12 NOTE — ED NOTES
Initial Clinical Review    Admission: Date/Time/Statement:   Admission Orders (From admission, onward)     Ordered        02/10/23 1109  Inpatient Admission  Once                      Orders Placed This Encounter   Procedures   • Inpatient Admission     Standing Status:   Standing     Number of Occurrences:   1     Order Specific Question:   Level of Care     Answer:   Med Surg [16]     Order Specific Question:   Estimated length of stay     Answer:   More than 2 Midnights     Order Specific Question:   Certification     Answer:   I certify that inpatient services are medically necessary for this patient for a duration of greater than two midnights  See H&P and MD Progress Notes for additional information about the patient's course of treatment  ED Arrival Information     Expected   -    Arrival   2/10/2023 08:41    Acuity   Emergent            Means of arrival   Walk-In    Escorted by   Police    Service   Hospitalist    Admission type   Emergency            Arrival complaint   not feeling well           Chief Complaint   Patient presents with   • Vomiting     Pt reports n/v x 3 episodes (non bloody) onset 02:30am, + abd pain hx obstruction and ileostomy noted fever this am, recent pneumonia 2 weeks ago was admitted to hospital, no recent ill contacts   Deborah Ayers recently dx with peptic ulcer       Initial Presentation: 34 y o  male  with a PMH of pneumonia 2 weeks ago, hospitalized and treated with Unasyn with transition to oral Augmentin on discharge which patient completed  PMH also includes recurrent strictures, small bowel obstruction, pouchitis, pouch dysfunction, multiple abdominal surgeries, ankylosing spondylitis, chronic abdominal pain,  bowel obstruction, ulcerative colitis and positive ileostomy who presents with vomiting, fever, chills and weakness  Patient reports that the day prior to presentation he felt weak   He woke up around 2:30 this morning and felt feverish with chills, took 2 Tylenol vomited Read pt rights and completed the application for voluntary admission. Pt provided with copy of paperwork. twice    In the ED patient was noted to have temperature 102 5,  CT of chest abdomen pelvis was performed which showed bilateral pulmonary consolidations that are now replaced by groundglass opacities although in a larger extent, new pneumonia is possible, a few prominent small bowel loops are seen without significant stone transition to suggest obstruction at this time, more distention of the J-pouch with continued mucosal enhancement noted as per radiology report  COVID test was negative  Patient did receive 3 L of normal saline, started on IV Zosyn 4 5 g and one-time dose of IV vancomycin  PE: AOX3, mucous membranes are dry,  lungs diminished at bases, ileostomy draining liquid brown stool  2/10 Inpatient admission for evaluation and treatment of pneumonia, sepsis, elevated LFTs:  IV zosyn, follow blood cultures, consult Pulmonology  CMP in a m      2/10 General Surgery consult:  Patient is a scheduled for J-pouch revision at [facility]   In preparation for J-pouch revision patient had a diabetic/protective loop ileostomy which complicated with the peritonitis and exploratory laparotomy  Patient nausea vomiting has resolved  Pain is improving  Continue antibiotics for bilateral pneumonia  Continue supportive symptomatic treatment as per medical team  Will ask GI if they can do limited flexible sigmoidoscopy and dilatation of J-pouch rectal anastomosis  2/11 Internal Medicine:  Patient with nausea, received IV Zofran around noon, hold clears at this time  Continue IVF, IV Zosyn  WBC decreased, blood cultures negative to date  Hemoglobin 8 7, suspect dilutional as patient has no overt signs of bleeding  Pulmonology consult:  Pneumonitis versus early pneumonia versus GI etiology  Continue with Zosyn and follow procalcitonin/culture data  No shortness of breath or chest pain, has cough with scant mucous     PE: AOx3, normal breath sounds         2/12  General Surgery:  Tolerating clear liquid, ileostomy functioning  Advance diet to full liquid  Pain control  Continue IV antibiotics for pneumonia  Internal Medicine:  Zosyn day 3, WBC decreased to 6, blood cultures negative to date  LFTs normalized  Hemoglobin 8 8  PE: AOx3, normal breath sounds, abdominal tenderness           ED Triage Vitals [02/10/23 0844]   Temperature Pulse Respirations Blood Pressure SpO2   (!) 102 5 °F (39 2 °C) (!) 140 19 121/63 95 %      Temp Source Heart Rate Source Patient Position - Orthostatic VS BP Location FiO2 (%)   Oral Monitor Sitting Left arm --      Pain Score       8          Wt Readings from Last 1 Encounters:   02/10/23 85 6 kg (188 lb 11 4 oz)     Additional Vital Signs:     Date/Time Temp Pulse Resp BP MAP (mmHg) SpO2 Calculated FIO2 (%) - Nasal Cannula Nasal Cannula O2 Flow Rate (L/min) O2 Device   02/12/23 15:07:22 97 5 °F (36 4 °C) 79 16 109/67 81 97 % -- -- --   02/12/23 07:26:31 97 2 °F (36 2 °C) Abnormal  71 16 105/68 80 94 % -- -- None (Room air)   02/12/23 02:52:36 97 2 °F (36 2 °C) Abnormal  71 18 121/82 95 93 % -- -- None (Room air)   02/11/23 23:16:01 97 4 °F (36 3 °C) Abnormal  67 18 118/66 83 93 % -- -- None (Room air)   02/11/23 19:57:29 97 8 °F (36 6 °C) 86 16 116/72 87 96 % -- -- None (Room air)   02/11/23 15:01:21 97 9 °F (36 6 °C) 95 16 115/72 86 96 % -- -- None (Room air)   02/11/23 07:52:55 97 4 °F (36 3 °C) Abnormal  84 17 119/73 88 96 % -- -- None (Room air)   02/11/23 0351 98 1 °F (36 7 °C) 84 17 114/61 82 96 % -- -- None (Room air)   02/10/23 2330 98 6 °F (37 °C) 109 Abnormal  16 121/61 81 95 % -- -- None (Room air)   02/10/23 2005 98 5 °F (36 9 °C) 107 Abnormal  16 120/73 90 96 % -- -- None (Room air)   02/10/23 1500 98 1 °F (36 7 °C) 98 18 107/59 77 92 % -- -- None (Room air)   02/10/23 1312 -- 90 22 -- -- 94 % -- -- None (Room air)   02/10/23 1228 98 1 °F (36 7 °C) 102 22 133/77 100 94 % -- -- None (Room air)   02/10/23 1200 -- 110 Abnormal  32 Abnormal  92/56 70 96 % -- -- --   02/10/23 1149 -- 104 16 117/68 -- 96 % 28 2 L/min Nasal cannula   02/10/23 1130 -- 119 Abnormal  13 100/64 -- 98 % 28 2 L/min Nasal cannula   02/10/23 1115 -- 112 Abnormal  25 Abnormal  107/56 76 97 % -- -- --   02/10/23 1100 -- 106 Abnormal  19 97/52 70 97 % -- -- None (Room air)   02/10/23 1055 98 8 °F (37 1 °C) -- -- -- -- -- -- -- --   02/10/23 1030 -- 102 18 97/51 70 98 % 28 2 L/min Nasal cannula   02/10/23 1000 -- 112 Abnormal  15 120/61 82 99 % -- -- --   02/10/23 0945 -- -- -- 123/68 89 -- -- -- --       Pertinent Labs/Diagnostic Test Results:       CT chest abdomen pelvis w contrast   Final Result by Shayne Woodard MD (02/10 1054)      Bilateral pulmonary consolidations are now replaced by groundglass opacities although in a larger extent  Although these may represent resolution of prior consolidation, a new pneumonia is possible and should be correlated clinically with new pulmonary    symptomatology  A few prominent small bowel loops are seen but without significant zone of transition to suggest obstruction at this time  Follow-up may be useful  There is more distention of the J-pouch with continued mucosal enhancement  Surgical assessment is advised given the variable distention of this segment prior scans        This was discussed with Dr Mariana Barkley at 10:50 AM            Workstation performed: [id number]           2/10 EKG:      Sinus tachycardia  Nonspecific T wave abnormality  Abnormal ECG  When compared with ECG of 13-NOV-2022 17:17,  No significant change was found      Results from last 7 days   Lab Units 02/10/23  0936   SARS-COV-2  Negative     Results from last 7 days   Lab Units 02/12/23  0503 02/11/23  0616 02/10/23  0901   WBC Thousand/uL 6 00 11 52* 20 22*   HEMOGLOBIN g/dL 8 8* 8 7* 10 6*   HEMATOCRIT % 29 8* 29 9* 35 0*   PLATELETS Thousands/uL 290 294 349   NEUTROS ABS Thousands/µL  --   --  17 04*         Results from last 7 days   Lab Units 02/12/23  0503 02/11/23  0616 02/10/23  0901   SODIUM mmol/L 135 138 134*   POTASSIUM mmol/L 3 4* 3 8 4 5   CHLORIDE mmol/L 99 102 99   CO2 mmol/L 29 28 26   ANION GAP mmol/L 7 8 9   BUN mg/dL 4* 5 10   CREATININE mg/dL 0 95 0 90 0 96   EGFR ml/min/1 73sq m 107 115 106   CALCIUM mg/dL 8 8 8 4 8 5   MAGNESIUM mg/dL  --  1 9  --      Results from last 7 days   Lab Units 02/12/23  0503 02/11/23  0616 02/10/23  0901   AST U/L 17 28 70*   ALT U/L 41 48 80*   ALK PHOS U/L 97 117* 130*   TOTAL PROTEIN g/dL 6 4 6 2* 7 1   ALBUMIN g/dL 3 0* 3 0* 3 5   TOTAL BILIRUBIN mg/dL 0 48 0 68 1 12*         Results from last 7 days   Lab Units 02/12/23  0503 02/11/23  0616 02/10/23  0901   GLUCOSE RANDOM mg/dL 106 57* 88             Results from last 7 days   Lab Units 02/10/23  0901   PROTIME seconds 13 1   INR  0 98   PTT seconds 30         Results from last 7 days   Lab Units 02/10/23  0901   PROCALCITONIN ng/ml 0 95*     Results from last 7 days   Lab Units 02/10/23  0901   LACTIC ACID mmol/L 1 0             Results from last 7 days   Lab Units 02/10/23  2123   CLARITY UA  Clear   COLOR UA  Yellow   SPEC GRAV UA  <=1 005   PH UA  6 0   GLUCOSE UA mg/dl Negative   KETONES UA mg/dl Negative   BLOOD UA  Negative   PROTEIN UA mg/dl Negative   NITRITE UA  Negative   BILIRUBIN UA  Negative   UROBILINOGEN UA E U /dl 0 2   LEUKOCYTES UA  Negative     Results from last 7 days   Lab Units 02/10/23  2124 02/10/23  0936   STREP PNEUMONIAE ANTIGEN, URINE  Negative  --    LEGIONELLA URINARY ANTIGEN  Negative  --    INFLUENZA A PCR   --  Negative   INFLUENZA B PCR   --  Negative   RSV PCR   --  Negative             Results from last 7 days   Lab Units 02/10/23  0901   BLOOD CULTURE  No Growth at 48 hrs  No Growth at 48 hrs                 ED Treatment:   Medication Administration from 02/10/2023 0841 to 02/10/2023 1227       Date/Time Order Dose Route Action     02/10/2023 1002 EST sodium chloride 0 9 % bolus 1,000 mL 0 mL Intravenous Stopped     02/10/2023 0907 EST sodium chloride 0 9 % bolus 1,000 mL 1,000 mL Intravenous New Bag     02/10/2023 1034 EST sodium chloride 0 9 % bolus 1,000 mL 0 mL Intravenous Stopped     02/10/2023 0931 EST sodium chloride 0 9 % bolus 1,000 mL 1,000 mL Intravenous New Bag     02/10/2023 1131 EST sodium chloride 0 9 % bolus 1,000 mL 0 mL Intravenous Stopped     02/10/2023 1003 EST sodium chloride 0 9 % bolus 1,000 mL 1,000 mL Intravenous New Bag     02/10/2023 1205 EST vancomycin (VANCOCIN) 2,000 mg in sodium chloride 0 9 % 500 mL IVPB 2,000 mg Intravenous Continue to Inpatient Floor     02/10/2023 1032 EST vancomycin (VANCOCIN) 2,000 mg in sodium chloride 0 9 % 500 mL IVPB 2,000 mg Intravenous New Bag     02/10/2023 1031 EST piperacillin-tazobactam (ZOSYN) IVPB 4 5 g 0 g Intravenous Stopped     02/10/2023 0924 EST piperacillin-tazobactam (ZOSYN) IVPB 4 5 g 4 5 g Intravenous New Bag     02/10/2023 1001 EST acetaminophen (TYLENOL) tablet 975 mg 975 mg Oral Given     02/10/2023 8132 EST HYDROmorphone (DILAUDID) injection 1 mg 1 mg Intravenous Given     02/10/2023 0919 EST ondansetron (ZOFRAN) injection 4 mg 4 mg Intravenous Given     02/10/2023 0940 EST HYDROmorphone (DILAUDID) injection 1 mg 1 mg Intravenous Given     02/10/2023 1002 EST iohexol (OMNIPAQUE) 350 MG/ML injection (SINGLE-DOSE) 100 mL 100 mL Intravenous Given     02/10/2023 1052 EST HYDROmorphone (DILAUDID) injection 1 mg 1 mg Intravenous Given        Past Medical History:   Diagnosis Date   • Ankylosing spondylitis (HCC)    • Anxiety    • Bowel obstruction (HCC)    • Clostridium difficile colitis 9/13/2018   • Colitis    • Ileal pouchitis ([facility] [address] ) 9/13/2018   • Pancreatitis    • Ulcerative colitis ([facility] [address] )      Present on Admission:  • Pneumonia of both lower lobes due to infectious organism  • Abdominal pain  • Ankylosing spondylitis (HCC)  • Elevated LFTs  • Anemia      Admitting Diagnosis: Vomiting [R11 10]  HCAP (healthcare-associated pneumonia) [J18 9]  Age/Sex: 34 y o  male       Admission Orders: Oscillatory Positive Expiratory pressure, clear liquid diet  Scheduled Medications:      DULoxetine, 60 mg, Oral, BID  enoxaparin, 40 mg, Subcutaneous, Daily  ferrous sulfate, 325 mg, Oral, Daily With Breakfast  pantoprazole, 40 mg, Oral, Daily  piperacillin-tazobactam, 4 5 g, Intravenous, Q6H  saccharomyces boulardii, 250 mg, Oral, BID      Continuous IV Infusions:      dextrose 5 % and sodium chloride 0 45 %, 125 mL/hr, Intravenous, Continuous      PRN Meds:  acetaminophen, 650 mg, Oral, Q6H PRN  benzonatate, 100 mg, Oral, TID PRN  HYDROmorphone, 1 mg, Intravenous, Q3H PRN  ondansetron, 4 mg, Intravenous, Q6H PRN      RN Medications 02/10 02/11 02/12   acetaminophen (TYLENOL) tablet 650 mg  Dose: 650 mg  Freq: Every 6 hours PRN Route: PO  PRN Reasons: mild pain,headaches,fever  Start: 02/10/23 1239     0018         benzonatate (TESSALON PERLES) capsule 100 mg  Dose: 100 mg  Freq: 3 times daily PRN Route: PO  PRN Reason: cough  Start: 02/10/23 1246   Admin Instructions:   Do NOT crush    1709          HYDROmorphone (DILAUDID) injection 1 mg  Dose: 1 mg  Freq: Every 3 hours PRN Route: IV  PRN Reason: severe pain  Start: 02/10/23 1915   Admin Instructions:   High alert medication  LOOK ALIKE SOUND ALIKE MED    2110      0015     0351     6381     2847     4058     7847    2118      0029     0343     0646     1007     1327        HYDROmorphone (DILAUDID) injection 1 mg  Dose: 1 mg  Freq: Every 4 hours PRN Route: IV  PRN Reason: severe pain  Start: 02/10/23 1236 End: 02/10/23 1902   Admin Instructions:   High alert medication  LOOK ALIKE SOUND ALIKE MED    3644 [C]     1902-D/C'd        ondansetron (ZOFRAN) injection 4 mg  Dose: 4 mg  Freq: Every 6 hours PRN Route: IV  PRN Reasons: nausea,vomiting  Start: 02/11/23 0000   Admin Instructions:   Push over 2 minutes       9753     1158     2119      1008        ondansetron (ZOFRAN-ODT) dispersible tablet 4 mg  Dose: 4 mg  Freq: Every 6 hours PRN Route: PO  PRN Reason: nausea  Start: 02/10/23 1240 End: 02/11/23 0000   Admin Instructions:   Orally-disintegrating tablets contain phenylalanine    1908      0000-D/C'd       Medications 02/10 02/11          IP CONSULT TO PULMONOLOGY  IP CONSULT TO ACUTE CARE SURGERY    Network Utilization Review Department  ATTENTION: Please call with any questions or concerns to [phone] and carefully listen to the prompts so that you are directed to the right person  All voicemails are confidential   Denice Curran all requests for admission clinical reviews, approved or denied determinations and any other requests to dedicated fax number below belonging to the campus where the patient is receiving treatment   List of dedicated fax numbers for the Facilities:  [address] DENIALS (Administrative/Medical Necessity) [phone]   [address] (Maternity/NICU/Pediatrics) [phone]   917 Ashtyn Perez [phone]   [facility] 77 [phone]   [address] [address] [facility] 28 [phone]   [facility] 134 815 [facility] [phone]

## 2024-07-10 NOTE — ED QUICK NOTES
Pt walked to restroom with steady  Gait. Render Risk Assessment In Note?: no Detail Level: Zone Recommendation Preamble: The following recommendations were made during the visit: routine self skin examinations.

## 2025-04-07 ENCOUNTER — TELEPHONE (OUTPATIENT)
Dept: ENDOCRINOLOGY | Age: 65
End: 2025-04-07

## 2025-04-17 ENCOUNTER — APPOINTMENT (OUTPATIENT)
Dept: ENDOCRINOLOGY | Age: 65
End: 2025-04-17

## 2025-04-17 ENCOUNTER — TELEPHONE (OUTPATIENT)
Dept: ENDOCRINOLOGY | Age: 65
End: 2025-04-17

## 2025-04-17 VITALS
SYSTOLIC BLOOD PRESSURE: 145 MMHG | DIASTOLIC BLOOD PRESSURE: 78 MMHG | HEIGHT: 65 IN | TEMPERATURE: 97.1 F | WEIGHT: 170.75 LBS | BODY MASS INDEX: 28.45 KG/M2 | OXYGEN SATURATION: 97 % | HEART RATE: 72 BPM

## 2025-04-17 DIAGNOSIS — M84.48XD SACRAL INSUFFICIENCY FRACTURE WITH ROUTINE HEALING, SUBSEQUENT ENCOUNTER: ICD-10-CM

## 2025-04-17 DIAGNOSIS — Z87.81 HISTORY OF VERTEBRAL COMPRESSION FRACTURE: ICD-10-CM

## 2025-04-17 DIAGNOSIS — Z92.241 HISTORY OF SYSTEMIC STEROID THERAPY: ICD-10-CM

## 2025-04-17 DIAGNOSIS — M81.0 POSTMENOPAUSAL OSTEOPOROSIS: Primary | ICD-10-CM

## 2025-04-17 DIAGNOSIS — Z87.39 HISTORY OF LUPUS NEPHRITIS: ICD-10-CM

## 2025-04-17 DIAGNOSIS — M15.0 PRIMARY OSTEOARTHRITIS INVOLVING MULTIPLE JOINTS: ICD-10-CM

## 2025-04-17 DIAGNOSIS — F17.200 CURRENT SMOKER: ICD-10-CM

## 2025-04-17 RX ORDER — BENZONATATE 200 MG/1
CAPSULE ORAL PRN
COMMUNITY
Start: 2024-12-23

## 2025-04-17 RX ORDER — GABAPENTIN 100 MG/1
100 CAPSULE ORAL 3 TIMES DAILY
COMMUNITY
Start: 2025-02-27

## 2025-04-17 RX ORDER — MOMETASONE FUROATE 50 UG/1
AEROSOL RESPIRATORY (INHALATION) 2 TIMES DAILY
COMMUNITY
Start: 2025-03-20

## 2025-04-17 RX ORDER — LAMOTRIGINE 100 MG/1
100 TABLET ORAL SEE ADMIN INSTRUCTIONS
COMMUNITY
Start: 2025-03-31

## 2025-04-17 RX ORDER — BECLOMETHASONE DIPROPIONATE HFA 40 UG/1
AEROSOL, METERED RESPIRATORY (INHALATION) 2 TIMES DAILY
COMMUNITY
Start: 2024-10-29

## 2025-04-17 RX ORDER — DEXTROMETHORPHAN HYDROBROMIDE, BUPROPION HYDROCHLORIDE 105; 45 MG/1; MG/1
1 TABLET, MULTILAYER, EXTENDED RELEASE ORAL DAILY
COMMUNITY
Start: 2024-12-12

## 2025-04-17 RX ORDER — MIDODRINE HYDROCHLORIDE 5 MG/1
10 TABLET ORAL DAILY
COMMUNITY
Start: 2024-10-28

## 2025-04-17 RX ORDER — GABAPENTIN 300 MG/1
300 CAPSULE ORAL NIGHTLY
COMMUNITY
Start: 2025-02-27

## 2025-04-17 RX ORDER — OXYBUTYNIN CHLORIDE 5 MG/1
10 TABLET ORAL PRN
COMMUNITY
Start: 2024-08-27 | End: 2025-08-27

## 2025-04-17 RX ORDER — DIAZEPAM 5 MG/1
5 TABLET ORAL SEE ADMIN INSTRUCTIONS
COMMUNITY

## 2025-04-21 ENCOUNTER — APPOINTMENT (OUTPATIENT)
Dept: ENDOCRINOLOGY | Age: 65
End: 2025-04-21

## 2025-04-21 ENCOUNTER — NURSE ONLY (OUTPATIENT)
Dept: PEDIATRIC ENDOCRINOLOGY | Age: 65
End: 2025-04-21

## 2025-04-21 VITALS
SYSTOLIC BLOOD PRESSURE: 148 MMHG | TEMPERATURE: 97.7 F | OXYGEN SATURATION: 96 % | HEART RATE: 101 BPM | DIASTOLIC BLOOD PRESSURE: 77 MMHG

## 2025-04-21 DIAGNOSIS — M85.80 OSTEOPENIA, UNSPECIFIED LOCATION: Primary | ICD-10-CM

## 2025-04-21 DIAGNOSIS — M81.0 POST-MENOPAUSAL OSTEOPOROSIS: ICD-10-CM

## 2025-04-21 PROCEDURE — 96372 THER/PROPH/DIAG INJ SC/IM: CPT | Performed by: INTERNAL MEDICINE

## 2025-04-21 PROCEDURE — X1094 NO CHARGE VISIT: HCPCS | Performed by: INTERNAL MEDICINE

## 2025-04-23 ENCOUNTER — APPOINTMENT (OUTPATIENT)
Dept: ENDOCRINOLOGY | Age: 65
End: 2025-04-23

## 2025-05-01 ENCOUNTER — TELEPHONE (OUTPATIENT)
Dept: ENDOCRINOLOGY | Age: 65
End: 2025-05-01

## 2025-05-19 ENCOUNTER — APPOINTMENT (OUTPATIENT)
Dept: PEDIATRIC ENDOCRINOLOGY | Age: 65
End: 2025-05-19

## 2025-05-19 VITALS
WEIGHT: 170.31 LBS | SYSTOLIC BLOOD PRESSURE: 124 MMHG | OXYGEN SATURATION: 98 % | DIASTOLIC BLOOD PRESSURE: 80 MMHG | BODY MASS INDEX: 28.37 KG/M2 | TEMPERATURE: 97.6 F | HEIGHT: 65 IN | HEART RATE: 88 BPM

## 2025-05-19 DIAGNOSIS — M85.80 OSTEOPENIA, UNSPECIFIED LOCATION: ICD-10-CM

## 2025-05-19 DIAGNOSIS — M81.0 POST-MENOPAUSAL OSTEOPOROSIS: Primary | ICD-10-CM

## 2025-05-19 PROCEDURE — 96372 THER/PROPH/DIAG INJ SC/IM: CPT | Performed by: INTERNAL MEDICINE

## 2025-06-16 ENCOUNTER — APPOINTMENT (OUTPATIENT)
Dept: PEDIATRIC ENDOCRINOLOGY | Age: 65
End: 2025-06-16

## 2025-06-18 ENCOUNTER — NURSE ONLY (OUTPATIENT)
Dept: PEDIATRIC ENDOCRINOLOGY | Age: 65
End: 2025-06-18

## 2025-06-18 VITALS
OXYGEN SATURATION: 97 % | TEMPERATURE: 97.1 F | WEIGHT: 161.93 LBS | HEIGHT: 66 IN | BODY MASS INDEX: 26.02 KG/M2 | SYSTOLIC BLOOD PRESSURE: 118 MMHG | HEART RATE: 76 BPM | DIASTOLIC BLOOD PRESSURE: 75 MMHG

## 2025-06-18 DIAGNOSIS — M81.0 POST-MENOPAUSAL OSTEOPOROSIS: Primary | ICD-10-CM

## 2025-07-16 ENCOUNTER — APPOINTMENT (OUTPATIENT)
Age: 65
End: 2025-07-16

## 2025-07-17 ENCOUNTER — APPOINTMENT (OUTPATIENT)
Age: 65
End: 2025-07-17

## 2025-07-17 VITALS
SYSTOLIC BLOOD PRESSURE: 116 MMHG | TEMPERATURE: 98.1 F | HEART RATE: 78 BPM | OXYGEN SATURATION: 95 % | DIASTOLIC BLOOD PRESSURE: 75 MMHG

## 2025-07-17 DIAGNOSIS — M81.0 POSTMENOPAUSAL OSTEOPOROSIS: Primary | ICD-10-CM

## 2025-08-04 ENCOUNTER — TELEPHONE (OUTPATIENT)
Age: 65
End: 2025-08-04

## 2025-08-18 ENCOUNTER — APPOINTMENT (OUTPATIENT)
Age: 65
End: 2025-08-18

## 2025-08-18 ENCOUNTER — TELEPHONE (OUTPATIENT)
Age: 65
End: 2025-08-18

## 2025-08-18 VITALS
HEART RATE: 81 BPM | DIASTOLIC BLOOD PRESSURE: 66 MMHG | TEMPERATURE: 98.5 F | OXYGEN SATURATION: 96 % | SYSTOLIC BLOOD PRESSURE: 123 MMHG

## 2025-08-18 DIAGNOSIS — M84.48XD SACRAL INSUFFICIENCY FRACTURE WITH ROUTINE HEALING, SUBSEQUENT ENCOUNTER: ICD-10-CM

## 2025-08-18 DIAGNOSIS — M81.0 POSTMENOPAUSAL OSTEOPOROSIS: Primary | ICD-10-CM

## 2025-08-22 ENCOUNTER — TELEPHONE (OUTPATIENT)
Age: 65
End: 2025-08-22

## 2025-09-04 ENCOUNTER — TELEPHONE (OUTPATIENT)
Age: 65
End: 2025-09-04

## 2025-09-18 ENCOUNTER — APPOINTMENT (OUTPATIENT)
Age: 65
End: 2025-09-18